# Patient Record
Sex: FEMALE | Race: WHITE | Employment: FULL TIME | URBAN - METROPOLITAN AREA
[De-identification: names, ages, dates, MRNs, and addresses within clinical notes are randomized per-mention and may not be internally consistent; named-entity substitution may affect disease eponyms.]

---

## 2017-01-25 ENCOUNTER — OFFICE VISIT (OUTPATIENT)
Dept: NEUROLOGY | Age: 47
End: 2017-01-25

## 2017-01-25 VITALS
SYSTOLIC BLOOD PRESSURE: 130 MMHG | RESPIRATION RATE: 18 BRPM | OXYGEN SATURATION: 98 % | DIASTOLIC BLOOD PRESSURE: 88 MMHG | HEART RATE: 98 BPM

## 2017-01-25 DIAGNOSIS — G43.109 MIGRAINE WITH VERTIGO: Primary | ICD-10-CM

## 2017-01-25 DIAGNOSIS — R42 VERTIGO: ICD-10-CM

## 2017-01-25 RX ORDER — AMITRIPTYLINE HYDROCHLORIDE 25 MG/1
25 TABLET, FILM COATED ORAL
Qty: 30 TAB | Refills: 3 | Status: SHIPPED | OUTPATIENT
Start: 2017-01-25 | End: 2017-04-21 | Stop reason: SDUPTHER

## 2017-01-25 NOTE — MR AVS SNAPSHOT
Visit Information Date & Time Provider Department Dept. Phone Encounter #  
 1/25/2017  1:40 PM Chantal Fritz Beasleyjames Jaramillo, [address] [facility] at [address] [id number] Follow-up Instructions Return in about 3 months (around 4/25/2017). Routing History Upcoming Health Maintenance Date Due DTaP/Tdap/Td series (1 - Tdap) 10/25/1991 INFLUENZA AGE 9 TO ADULT 8/1/2016 PAP AKA CERVICAL CYTOLOGY 5/14/2017 Allergies as of 1/25/2017  Review Complete On: 1/25/2017 By: Connye Lesches, LPN Severity Noted Reaction Type Reactions Aspirin High 03/09/2010   Intolerance Unknown (comments) Avoids due to brain aneurysm Current Immunizations  Never Reviewed No immunizations on file. Not reviewed this visit You Were Diagnosed With   
  
 Codes Comments Migraine with vertigo    -  Primary ICD-10-CM: G43.109 ICD-9-CM: 346.00 Vertigo     ICD-10-CM: L38 ICD-9-CM: 780.4 Vitals BP Pulse Resp SpO2 OB Status Smoking Status 130/88 98 18 98% Hysterectomy Former Smoker Vitals History Preferred Pharmacy Pharmacy Name Phone [phone] [facility] [phone] Your Updated Medication List  
  
   
This list is accurate as of: 1/25/17  1:58 PM.  Always use your most recent med list.  
  
  
  
  
 amitriptyline 25 mg tablet Commonly known as:  ELAVIL Take 1 Tab by mouth nightly. Start one half tablet at bedtime for 5 nights then increase to 1 tablet thereafter  
  
 butalbital-acetaminophen-caffeine -40 mg per tablet Commonly known as:  Mardeen Gaudencio Take 1 Tab by mouth every four (4) hours as needed for Pain. Do not exceed 6 tabs a day HYDROcodone-acetaminophen 5-325 mg per tablet Commonly known as:  Wayna Glaze Take  by mouth.  
  
 magnesium oxide 400 mg tablet Commonly known as:  MAG-OX Take 1 Tab by mouth nightly. methylPREDNISolone 4 mg tablet Commonly known as:  Efrain Tobin As directed  
  
 ondansetron 4 mg disintegrating tablet Commonly known as:  ZOFRAN ODT Take 1 Tab by mouth every eight (8) hours as needed for Nausea. topiramate 50 mg tablet Commonly known as:  TOPAMAX Take 1 Tab by mouth two (2) times a day. Indications: MIGRAINE PREVENTION  
  
 TYLENOL EXTRA STRENGTH 500 mg tablet Generic drug:  acetaminophen Take  by mouth every six (6) hours as needed for Pain. ZyrTEC 10 mg tablet Generic drug:  cetirizine Take 10 mg by mouth daily. Indications: SEASONAL ALLERGIC RHINITIS Prescriptions Sent to Pharmacy Refills  
 amitriptyline (ELAVIL) 25 mg tablet 3 Sig: Take 1 Tab by mouth nightly. Start one half tablet at bedtime for 5 nights then increase to 1 tablet thereafter Class: Normal  
 [facility] [address] [place] [address] #: [phone] Route: Oral  
  
Follow-up Instructions Return in about 3 months (around 4/25/2017). Patient Instructions PRESCRIPTION REFILL POLICY [facility] Statement to Patients April 1, 2014 In an effort to ensure the large volume of patient prescription refills is processed in the most efficient and expeditious manner, we are asking our patients to assist us by calling your Pharmacy for all prescription refills, this will include also your  Mail Order Pharmacy. The pharmacy will contact our office electronically to continue the refill process. Please do not wait until the last minute to call your pharmacy. We need at least 48 hours (2days) to fill prescriptions. We also encourage you to call your pharmacy before going to  your prescription to make sure it is ready.   
 
With regard to controlled substance prescription refill requests (narcotic refills) that need to be picked up at our office, we ask your cooperation by providing us with at least 72 hours (3days) notice that you will need a refill. We will not refill narcotic prescription refill requests after 4:00pm on any weekday, Monday through Thursday, or after 2:00pm on Fridays, or on the weekends. We encourage everyone to explore another way of getting your prescription refill request processed using [email or web address], our patient web portal through our electronic medical record system. GoalShare.com is an efficient and effective way to communicate your medication request directly to the office and  downloadable as an refugio on your smart phone . GoalShare.com also features a review functionality that allows you to view your medication list as well as leave messages for your physician. Are you ready to get connected? If so please review the attatched instructions or speak to any of our staff to get you set up right away! Thank you so much for your cooperation. Should you have any questions please contact our Practice Administrator. The Physicians and Staff,  [facility] Thank you for choosing [facility] and [facility] for your  
 
care. You may receive a survey about your visit. We appreciate you taking time  
 
to complete this survey as we use your feedback to improve our services. We  
 
realize we are not perfect, but we strive to provide excellent care. Introducing [facility] [place] [facility]! [facility] introduces [email or web address] patient portal. Now you can access parts of your medical record, email your doctor's office, and request medication refills online. 1. In your internet browser, go to [email or web address] 2. Click on the First Time User? Click Here link in the Sign In box. You will see the New Member Sign Up page. 3. Enter your GoalShare.com Access Code exactly as it appears below.  You will not need to use this code after youve completed the sign-up process. If you do not sign up before the expiration date, you must request a new code. · TripShake Access Code: [email or web address] Expires: 4/25/2017  1:01 PM 
 
4. Enter the last four digits of your Social Security Number (xxxx) and Date of Birth (mm/dd/yyyy) as indicated and click Submit. You will be taken to the next sign-up page. 5. Create a TripShake ID. This will be your TripShake login ID and cannot be changed, so think of one that is secure and easy to remember. 6. Create a TripShake password. You can change your password at any time. 7. Enter your Password Reset Question and Answer. This can be used at a later time if you forget your password. 8. Enter your e-mail address. You will receive e-mail notification when new information is available in [address]. 9. Click Sign Up. You can now view and download portions of your medical record. 10. Click the Download Summary menu link to download a portable copy of your medical information. If you have questions, please visit the Frequently Asked Questions section of the TripShake website. Remember, TripShake is NOT to be used for urgent needs. For medical emergencies, dial 911. Now available from your iPhone and Android! Please provide this summary of care documentation to your next provider. Your primary care clinician is listed as Alphonso Oviedo. If you have any questions after today's visit, please call [phone].

## 2017-01-25 NOTE — PROGRESS NOTES
Chief Complaint   Patient presents with    Headache     follow up       HPI    Ms. Hamilton Templeton is a 59-year-old woman here to follow-up. She has been seen here for chronic migraine for which she takes Topamax and magnesium with good results. She cannot remember the last time she had a severe migraine. She continues to have vertigo unfortunately which can occur at rest.  She is already been evaluated by ENT and no cause has been found. Review of Systems   Neurological: Positive for dizziness. All other systems reviewed and are negative. Past Medical History   Diagnosis Date    Anemia NEC     Aneurysm     Delivery normal      x3    Dermatitis     Headache(784.0)     HX OTHER MEDICAL     Stroke ([facility] [address]      brain anuerysm, 1998    Vertigo      Family History   Problem Relation Age of Onset    Diabetes Mother     Hypertension Mother     Heart Disease Maternal Aunt      Social History     Social History    Marital status:      Spouse name: N/A    Number of children: N/A    Years of education: N/A     Occupational History    Not on file. Social History Main Topics    Smoking status: Former Smoker     Packs/day: 1.00     Years: 5.00     Quit date: 3/9/2004    Smokeless tobacco: Never Used    Alcohol use Yes      Comment: rarely    Drug use: No    Sexual activity: Not Currently     Partners: Male     Birth control/ protection: None     Other Topics Concern    Not on file     Social History Narrative     Allergies   Allergen Reactions    Aspirin Unknown (comments)     Avoids due to brain aneurysm         Current Outpatient Prescriptions   Medication Sig    amitriptyline (ELAVIL) 25 mg tablet Take 1 Tab by mouth nightly. Start one half tablet at bedtime for 5 nights then increase to 1 tablet thereafter    topiramate (TOPAMAX) 50 mg tablet Take 1 Tab by mouth two (2) times a day.  Indications: MIGRAINE PREVENTION    magnesium oxide (MAG-OX) 400 mg tablet Take 1 Tab by mouth nightly.  butalbital-acetaminophen-caffeine (FIORICET, ESGIC) -40 mg per tablet Take 1 Tab by mouth every four (4) hours as needed for Pain. Do not exceed 6 tabs a day (Patient taking differently: Take  by mouth every four (4) hours as needed for Pain. Do not exceed 6 tabs a day)    cetirizine (ZYRTEC) 10 mg tablet Take 10 mg by mouth daily. Indications: SEASONAL ALLERGIC RHINITIS    acetaminophen (TYLENOL EXTRA STRENGTH) 500 mg tablet Take  by mouth every six (6) hours as needed for Pain.  HYDROcodone-acetaminophen (NORCO) 5-325 mg per tablet Take  by mouth.  methylPREDNISolone (MEDROL DOSEPACK) 4 mg tablet As directed    ondansetron (ZOFRAN ODT) 4 mg disintegrating tablet Take 1 Tab by mouth every eight (8) hours as needed for Nausea. No current facility-administered medications for this visit. Neurologic Exam     Mental Status        WD/WN adult in NAD, normal grooming  VSS  A&O x 3    PERRL, nonicteric  Face is symmetric, tongue midline  Speech is fluent and clear  No limb ataxia. No abnl movements. Moving all extemities spontaneously and symmetric  Normal gait    CVS RRR  Lungs nonlabored  Skin is warm and dry         Visit Vitals    /88    Pulse 98    Resp 18    SpO2 98%       Assessment and Plan   Pillo Byron was seen today for headache. Diagnoses and all orders for this visit:    Migraine with vertigo    Vertigo    Other orders  -     amitriptyline (ELAVIL) 25 mg tablet; Take 1 Tab by mouth nightly. Start one half tablet at bedtime for 5 nights then increase to 1 tablet thereafter      60-year-old woman with chronic migraine headaches responding to Topamax and magnesium regarding the severe painful migraines. I have suspicion the vertiginous events could be migraine equivalent. However, these symptoms occur at rest and are not exactly positional.  She also has a history of aneurysm requiring clipping and is a smoker.   I think it is prudent to obtain CTA of the head neck to determine the integrity of the posterior circulation and ensure there is no stenosis accounting for her symptoms. For now, I think it is reasonable to add amitriptyline low-dose for now as a trial to hopefully alleviate some of her symptoms. .    I would like her to follow-up in 3 months.       [address] [facility], [facility] [address]  Diplomate RUSS

## 2017-01-25 NOTE — PATIENT INSTRUCTIONS
10 [facility]   Statement to Patients  April 1, 2014      In an effort to ensure the large volume of patient prescription refills is processed in the most efficient and expeditious manner, we are asking our patients to assist us by calling your Pharmacy for all prescription refills, this will include also your  Mail Order Pharmacy. The pharmacy will contact our office electronically to continue the refill process. Please do not wait until the last minute to call your pharmacy. We need at least 48 hours (2days) to fill prescriptions. We also encourage you to call your pharmacy before going to  your prescription to make sure it is ready. With regard to controlled substance prescription refill requests (narcotic refills) that need to be picked up at our office, we ask your cooperation by providing us with at least 72 hours (3days) notice that you will need a refill. We will not refill narcotic prescription refill requests after 4:00pm on any weekday, Monday through Thursday, or after 2:00pm on Fridays, or on the weekends. We encourage everyone to explore another way of getting your prescription refill request processed using Sm[employer] Bucket, our patient web portal through our electronic medical record system. Smash Bucket is an efficient and effective way to communicate your medication request directly to the office and  downloadable as an refugio on your smart phone . Smash Bucket also features a review functionality that allows you to view your medication list as well as leave messages for your physician. Are you ready to get connected? If so please review the attatched instructions or speak to any of our staff to get you set up right away! Thank you so much for your cooperation. Should you have any questions please contact our Practice Administrator.     The Physicians and Staff,  [facility]     Thank you for choosing [facility] for your     care. You may receive a survey about your visit. We appreciate you taking time     to complete this survey as we use your feedback to improve our services. We     realize we are not perfect, but we strive to provide excellent care.

## 2017-02-01 ENCOUNTER — HOSPITAL ENCOUNTER (OUTPATIENT)
Dept: CT IMAGING | Age: 47
Discharge: HOME | End: 2017-02-01
Attending: PSYCHIATRY & NEUROLOGY
Payer: COMMERCIAL

## 2017-02-01 DIAGNOSIS — R42 VERTIGO: ICD-10-CM

## 2017-02-01 PROCEDURE — 74011250636 HC RX REV CODE- 250/636: Performed by: PSYCHIATRY & NEUROLOGY

## 2017-02-01 PROCEDURE — 70498 CT ANGIOGRAPHY NECK: CPT

## 2017-02-01 PROCEDURE — 74011636320 HC RX REV CODE- 636/320: Performed by: PSYCHIATRY & NEUROLOGY

## 2017-02-01 RX ORDER — SODIUM CHLORIDE 9 MG/ML
50 INJECTION, SOLUTION INTRAVENOUS
Status: COMPLETED | OUTPATIENT
Start: 2017-02-01 | End: 2017-02-01

## 2017-02-01 RX ORDER — SODIUM CHLORIDE 0.9 % (FLUSH) 0.9 %
10 SYRINGE (ML) INJECTION
Status: COMPLETED | OUTPATIENT
Start: 2017-02-01 | End: 2017-02-01

## 2017-02-01 RX ADMIN — SODIUM CHLORIDE 50 ML/HR: 900 INJECTION, SOLUTION INTRAVENOUS at 13:48

## 2017-02-01 RX ADMIN — IOPAMIDOL 100 ML: 755 INJECTION, SOLUTION INTRAVENOUS at 13:48

## 2017-02-01 RX ADMIN — Medication 10 ML: at 13:48

## 2017-02-21 ENCOUNTER — TELEPHONE (OUTPATIENT)
Dept: NEUROLOGY | Age: 47
End: 2017-02-21

## 2017-02-21 NOTE — TELEPHONE ENCOUNTER
Pt calling in re to test results from the test she had done on February 1st. Please give her a call back

## 2017-04-21 ENCOUNTER — OFFICE VISIT (OUTPATIENT)
Dept: NEUROLOGY | Age: 47
End: 2017-04-21

## 2017-04-21 VITALS
OXYGEN SATURATION: 98 % | SYSTOLIC BLOOD PRESSURE: 122 MMHG | HEART RATE: 105 BPM | RESPIRATION RATE: 18 BRPM | DIASTOLIC BLOOD PRESSURE: 90 MMHG

## 2017-04-21 DIAGNOSIS — G43.109 MIGRAINE WITH VERTIGO: Primary | ICD-10-CM

## 2017-04-21 RX ORDER — AMITRIPTYLINE HYDROCHLORIDE 25 MG/1
25 TABLET, FILM COATED ORAL
Qty: 30 TAB | Refills: 3 | Status: SHIPPED | OUTPATIENT
Start: 2017-04-21 | End: 2017-07-19 | Stop reason: SDUPTHER

## 2017-04-21 RX ORDER — TOPIRAMATE 50 MG/1
50 TABLET, FILM COATED ORAL 2 TIMES DAILY
Qty: 120 TAB | Refills: 2 | Status: SHIPPED | OUTPATIENT
Start: 2017-04-21 | End: 2017-07-19 | Stop reason: SDUPTHER

## 2017-04-21 RX ORDER — LORATADINE 10 MG/1
10 TABLET ORAL
COMMUNITY
End: 2021-12-07 | Stop reason: ALTCHOICE

## 2017-04-21 NOTE — LETTER
4/21/2017 Patient:  Jillian Yun YOB: 1970 Date of Visit: 4/21/2017 Dear Leydi Wade MD 
[address], [address] . [address] [facility] [address] VIA In Basket : 
 
 
I was requested by Leydi Wade MD to evaluate Ms. Ewelina Celaya  for Chief Complaint Patient presents with  
 Headache Wolm Faustino I am recommending the following:  
 
Yesenia Garza was seen today for headache. Diagnoses and all orders for this visit: 
 
Migraine with vertigo Other orders -     topiramate (TOPAMAX) 50 mg tablet; Take 1 Tab by mouth two (2) times a day. Indications: MIGRAINE PREVENTION 
-     amitriptyline (ELAVIL) 25 mg tablet; Take 1 Tab by mouth nightly. 
 
 
 
---------------------------------------------------------------------------------------------------------------------- Below is my encounter: Chief Complaint Patient presents with  
 Headache HPI Ms. Anna Cisneros is a 55-year-old woman here to follow-up on migraine headaches and vertiginous symptoms. History of intracranial aneurysm clipping. Since her last visit we added amitriptyline to Topamax and magnesium. Overall good results. She only had one severe vertiginous event. Headaches are more controlled. CTA is also very benign. Review of Systems Neurological: Positive for dizziness and headaches. All other systems reviewed and are negative. Past Medical History:  
Diagnosis Date  Anemia NEC  Aneurysm  Delivery normal   
 x3  Dermatitis  Headache [address]  Stroke ([facility] [address] brain anuerysm, 1998  Vertigo Family History Problem Relation Age of Onset  Diabetes Mother  Hypertension Mother  Heart Disease Maternal Aunt Social History Social History  Marital status:  Spouse name: N/A  
 Number of children: N/A  
 Years of education: N/A Occupational History  Not on file. Social History Main Topics  Smoking status: Former Smoker Packs/day: 1.00 Years: 5.00 Quit date: 3/9/2004  Smokeless tobacco: Never Used  Alcohol use Yes Comment: rarely  Drug use: No  
 Sexual activity: Not Currently Partners: Male Birth control/ protection: None Other Topics Concern  Not on file Social History Narrative Allergies Allergen Reactions  Aspirin Unknown (comments) Avoids due to brain aneurysm Current Outpatient Prescriptions Medication Sig  loratadine (CLARITIN) 10 mg tablet Take 10 mg by mouth.  topiramate (TOPAMAX) 50 mg tablet Take 1 Tab by mouth two (2) times a day. Indications: MIGRAINE PREVENTION  
 amitriptyline (ELAVIL) 25 mg tablet Take 1 Tab by mouth nightly.  magnesium oxide (MAG-OX) 400 mg tablet Take 1 Tab by mouth nightly.  butalbital-acetaminophen-caffeine (FIORICET, ESGIC) -40 mg per tablet Take 1 Tab by mouth every four (4) hours as needed for Pain. Do not exceed 6 tabs a day (Patient taking differently: Take  by mouth every four (4) hours as needed for Pain. Do not exceed 6 tabs a day)  HYDROcodone-acetaminophen (NORCO) 5-325 mg per tablet Take  by mouth.  methylPREDNISolone (MEDROL DOSEPACK) 4 mg tablet As directed  cetirizine (ZYRTEC) 10 mg tablet Take 10 mg by mouth daily. Indications: SEASONAL ALLERGIC RHINITIS  ondansetron (ZOFRAN ODT) 4 mg disintegrating tablet Take 1 Tab by mouth every eight (8) hours as needed for Nausea.  acetaminophen (TYLENOL EXTRA STRENGTH) 500 mg tablet Take  by mouth every six (6) hours as needed for Pain. No current facility-administered medications for this visit. Neurologic Exam  
 
Mental Status WD/WN adult in NAD, normal grooming VSS 
A&O x 3 PERRL, nonicteric Face is symmetric, tongue midline Speech is fluent and clear No limb ataxia. No abnl movements. Moving all extemities spontaneously and symmetric Normal gait CVS RRR Lungs nonlabored Skin is warm and dry Visit Vitals  /90  Pulse (!) 105  Resp 18  SpO2 98% Assessment and Plan Migraine with vertigo Other orders -     topiramate (TOPAMAX) 50 mg tablet; Take 1 Tab by mouth two (2) times a day. Indications: MIGRAINE PREVENTION 
-     amitriptyline (ELAVIL) 25 mg tablet; Take 1 Tab by mouth nightly. 44-year-old woman who has migraine headaches and vertiginous migraine as well. Good control using topiramate and amitriptyline. No changes to that. Okay to stop magnesium since she has been out of it and there is been no change. I discussed the CTA results with her as well. I would like her to follow-up in 3 months. Thank you for giving me the opportunity to assist in the care of Ms. Juan Krueger. If you have questions, please do not hesitate to contact me. Sincerely, [address] [facility], DO Neurologist 
Diplomate RUSS

## 2017-04-21 NOTE — PATIENT INSTRUCTIONS
10 [facility]   Statement to Patients  April 1, 2014      In an effort to ensure the large volume of patient prescription refills is processed in the most efficient and expeditious manner, we are asking our patients to assist us by calling your Pharmacy for all prescription refills, this will include also your  Mail Order Pharmacy. The pharmacy will contact our office electronically to continue the refill process. Please do not wait until the last minute to call your pharmacy. We need at least 48 hours (2days) to fill prescriptions. We also encourage you to call your pharmacy before going to  your prescription to make sure it is ready. With regard to controlled substance prescription refill requests (narcotic refills) that need to be picked up at our office, we ask your cooperation by providing us with at least 72 hours (3days) notice that you will need a refill. We will not refill narcotic prescription refill requests after 4:00pm on any weekday, Monday through Thursday, or after 2:00pm on Fridays, or on the weekends. We encourage everyone to explore another way of getting your prescription refill request processed using Sim[employer]iField, our patient web portal through our electronic medical record system. SimpliField is an efficient and effective way to communicate your medication request directly to the office and  downloadable as an refugio on your smart phone . Sim[employer]iField also features a review functionality that allows you to view your medication list as well as leave messages for your physician. Are you ready to get connected? If so please review the attatched instructions or speak to any of our staff to get you set up right away! Thank you so much for your cooperation. Should you have any questions please contact our Practice Administrator.     The Physicians and Staff,  [facility]     Thank you for choosing [facility] and [facility] for your     care. You may receive a survey about your visit. We appreciate you taking time     to complete this survey as we use your feedback to improve our services. We     realize we are not perfect, but we strive to provide excellent care.

## 2017-04-21 NOTE — MR AVS SNAPSHOT
Visit Information Date & Time Provider Department Dept. Phone Encounter #  
 4/21/2017 10:40  [facility] [address] [id number] Follow-up Instructions Return in about 3 months (around 7/21/2017). Routing History Upcoming Health Maintenance Date Due DTaP/Tdap/Td series (1 - Tdap) 10/25/1991 INFLUENZA AGE 9 TO ADULT 8/1/2016 PAP AKA CERVICAL CYTOLOGY 5/14/2017 Allergies as of 4/21/2017  Review Complete On: 4/21/2017 By: David Castro LPN Severity Noted Reaction Type Reactions Aspirin High 03/09/2010   Intolerance Unknown (comments) Avoids due to brain aneurysm Current Immunizations  Never Reviewed No immunizations on file. Not reviewed this visit You Were Diagnosed With   
  
 Codes Comments Migraine with vertigo    -  Primary ICD-10-CM: G43.109 ICD-9-CM: 346.00 Vitals BP Pulse Resp SpO2 OB Status Smoking Status 122/90 (!) 105 18 98% Hysterectomy Former Smoker Preferred Pharmacy Pharmacy Name Phone [id number] [phone] [facility] [phone] Your Updated Medication List  
  
   
This list is accurate as of: 4/21/17 11:24 AM.  Always use your most recent med list.  
  
  
  
  
 amitriptyline 25 mg tablet Commonly known as:  ELAVIL Take 1 Tab by mouth nightly. butalbital-acetaminophen-caffeine -40 mg per tablet Commonly known as:  Reginold Smolder Take 1 Tab by mouth every four (4) hours as needed for Pain. Do not exceed 6 tabs a day CLARITIN 10 mg tablet Generic drug:  loratadine Take 10 mg by mouth. HYDROcodone-acetaminophen 5-325 mg per tablet Commonly known as:  Blue Julio Take  by mouth.  
  
 magnesium oxide 400 mg tablet Commonly known as:  MAG-OX Take 1 Tab by mouth nightly. methylPREDNISolone 4 mg tablet Commonly known as:  Cari Fabian As directed  
  
 ondansetron 4 mg disintegrating tablet Commonly known as:  ZOFRAN ODT Take 1 Tab by mouth every eight (8) hours as needed for Nausea. topiramate 50 mg tablet Commonly known as:  TOPAMAX Take 1 Tab by mouth two (2) times a day. Indications: MIGRAINE PREVENTION  
  
 TYLENOL EXTRA STRENGTH 500 mg tablet Generic drug:  acetaminophen Take  by mouth every six (6) hours as needed for Pain. ZyrTEC 10 mg tablet Generic drug:  cetirizine Take 10 mg by mouth daily. Indications: SEASONAL ALLERGIC RHINITIS Prescriptions Sent to Pharmacy Refills  
 topiramate (TOPAMAX) 50 mg tablet 2 Sig: Take 1 Tab by mouth two (2) times a day. Indications: MIGRAINE PREVENTION Class: Normal  
 Pharmacy: [address] #: [phone] Route: Oral  
 amitriptyline (ELAVIL) 25 mg tablet 3 Sig: Take 1 Tab by mouth nightly. Class: Normal  
 Pharmacy: [address] #: [phone] Route: Oral  
  
Follow-up Instructions Return in about 3 months (around 7/21/2017). Patient Instructions PRESCRIPTION REFILL POLICY [facility] Statement to Patients April 1, 2014 In an effort to ensure the large volume of patient prescription refills is processed in the most efficient and expeditious manner, we are asking our patients to assist us by calling your Pharmacy for all prescription refills, this will include also your  Mail Order Pharmacy. The pharmacy will contact our office electronically to continue the refill process. Please do not wait until the last minute to call your pharmacy. We need at least 48 hours (2days) to fill prescriptions.  We also encourage you to call your pharmacy before going to  your prescription to make sure it is ready. With regard to controlled substance prescription refill requests (narcotic refills) that need to be picked up at our office, we ask your cooperation by providing us with at least 72 hours (3days) notice that you will need a refill. We will not refill narcotic prescription refill requests after 4:00pm on any weekday, Monday through Thursday, or after 2:00pm on Fridays, or on the weekends. We encourage everyone to explore another way of getting your prescription refill request processed using [employer], our patient web portal through our electronic medical record system. [employer] is an efficient and effective way to communicate your medication request directly to the office and  downloadable as an refugio on your smart phone . [employer] also features a review functionality that allows you to view your medication list as well as leave messages for your physician. Are you ready to get connected? If so please review the attatched instructions or speak to any of our staff to get you set up right away! Thank you so much for your cooperation. Should you have any questions please contact our Practice Administrator. The Physicians and Staff,  [facility] Thank you for choosing [facility] and [facility] for your  
 
care. You may receive a survey about your visit. We appreciate you taking time  
 
to complete this survey as we use your feedback to improve our services. We  
 
realize we are not perfect, but we strive to provide excellent care. Introducing [facility] SERVICES! [facility] introduces [employer] patient portal. Now you can access parts of your medical record, email your doctor's office, and request medication refills online. 1. In your internet browser, go to [email or web address] 2. Click on the First Time User? Click Here link in the Sign In box. You will see the New Member Sign Up page. 3. Enter your [employer] Access Code exactly as it appears below. You will not need to use this code after youve completed the sign-up process. If you do not sign up before the expiration date, you must request a new code. · [employer] Access Code: [email or web address] Expires: 4/25/2017  2:01 PM 
 
4. Enter the last four digits of your Social Security Number (xxxx) and Date of Birth (mm/dd/yyyy) as indicated and click Submit. You will be taken to the next sign-up page. 5. Create a [employer] ID. This will be your [employer] login ID and cannot be changed, so think of one that is secure and easy to remember. 6. Create a [employer] password. You can change your password at any time. 7. Enter your Password Reset Question and Answer. This can be used at a later time if you forget your password. 8. Enter your e-mail address. You will receive e-mail notification when new information is available in [address]. 9. Click Sign Up. You can now view and download portions of your medical record. 10. Click the Download Summary menu link to download a portable copy of your medical information. If you have questions, please visit the Frequently Asked Questions section of the [employer] website. Remember, [employer] is NOT to be used for urgent needs. For medical emergencies, dial 911. Now available from your iPhone and Android! Please provide this summary of care documentation to your next provider. Your primary care clinician is listed as [place] Officer. If you have any questions after today's visit, please call [phone].

## 2017-04-21 NOTE — COMMUNICATION BODY
Chief Complaint   Patient presents with    Headache       HPI    Ms. Swetha Leiva is a 24-year-old woman here to follow-up on migraine headaches and vertiginous symptoms. History of intracranial aneurysm clipping. Since her last visit we added amitriptyline to Topamax and magnesium. Overall good results. She only had one severe vertiginous event. Headaches are more controlled. CTA is also very benign. Review of Systems   Neurological: Positive for dizziness and headaches. All other systems reviewed and are negative. Past Medical History:   Diagnosis Date    Anemia NEC     Aneurysm     Delivery normal     x3    Dermatitis     Headache     HX OTHER MEDICAL     Stroke [address] [address]     brain anuerysm, 1998    Vertigo      Family History   Problem Relation Age of Onset    Diabetes Mother     Hypertension Mother     Heart Disease Maternal Aunt      Social History     Social History    Marital status:      Spouse name: N/A    Number of children: N/A    Years of education: N/A     Occupational History    Not on file. Social History Main Topics    Smoking status: Former Smoker     Packs/day: 1.00     Years: 5.00     Quit date: 3/9/2004    Smokeless tobacco: Never Used    Alcohol use Yes      Comment: rarely    Drug use: No    Sexual activity: Not Currently     Partners: Male     Birth control/ protection: None     Other Topics Concern    Not on file     Social History Narrative     Allergies   Allergen Reactions    Aspirin Unknown (comments)     Avoids due to brain aneurysm         Current Outpatient Prescriptions   Medication Sig    loratadine (CLARITIN) 10 mg tablet Take 10 mg by mouth.  topiramate (TOPAMAX) 50 mg tablet Take 1 Tab by mouth two (2) times a day. Indications: MIGRAINE PREVENTION    amitriptyline (ELAVIL) 25 mg tablet Take 1 Tab by mouth nightly.  magnesium oxide (MAG-OX) 400 mg tablet Take 1 Tab by mouth nightly.     butalbital-acetaminophen-caffeine (FIORICET, ESGIC) -40 mg per tablet Take 1 Tab by mouth every four (4) hours as needed for Pain. Do not exceed 6 tabs a day (Patient taking differently: Take  by mouth every four (4) hours as needed for Pain. Do not exceed 6 tabs a day)    HYDROcodone-acetaminophen (NORCO) 5-325 mg per tablet Take  by mouth.  methylPREDNISolone (MEDROL DOSEPACK) 4 mg tablet As directed    cetirizine (ZYRTEC) 10 mg tablet Take 10 mg by mouth daily. Indications: SEASONAL ALLERGIC RHINITIS    ondansetron (ZOFRAN ODT) 4 mg disintegrating tablet Take 1 Tab by mouth every eight (8) hours as needed for Nausea.  acetaminophen (TYLENOL EXTRA STRENGTH) 500 mg tablet Take  by mouth every six (6) hours as needed for Pain. No current facility-administered medications for this visit. Neurologic Exam     Mental Status        WD/WN adult in NAD, normal grooming  VSS  A&O x 3    PERRL, nonicteric  Face is symmetric, tongue midline  Speech is fluent and clear  No limb ataxia. No abnl movements. Moving all extemities spontaneously and symmetric  Normal gait    CVS RRR  Lungs nonlabored  Skin is warm and dry         Visit Vitals    /90    Pulse (!) 105    Resp 18    SpO2 98%       Assessment and Plan   Rosalinda Overton was seen today for headache. Diagnoses and all orders for this visit:    Migraine with vertigo    Other orders  -     topiramate (TOPAMAX) 50 mg tablet; Take 1 Tab by mouth two (2) times a day. Indications: MIGRAINE PREVENTION  -     amitriptyline (ELAVIL) 25 mg tablet; Take 1 Tab by mouth nightly. 42-year-old woman who has migraine headaches and vertiginous migraine as well. Good control using topiramate and amitriptyline. No changes to that. Okay to stop magnesium since she has been out of it and there is been no change. I discussed the CTA results with her as well. I would like her to follow-up in 3 months.           [address] [facility], [address]  Diplomate SINGHN

## 2017-04-21 NOTE — PROGRESS NOTES
Chief Complaint   Patient presents with    Headache       HPI    Ms. Markie Hughes is a 26-year-old woman here to follow-up on migraine headaches and vertiginous symptoms. History of intracranial aneurysm clipping. Since her last visit we added amitriptyline to Topamax and magnesium. Overall good results. She only had one severe vertiginous event. Headaches are more controlled. CTA is also very benign. Review of Systems   Neurological: Positive for dizziness and headaches. All other systems reviewed and are negative. Past Medical History:   Diagnosis Date    Anemia NEC     Aneurysm     Delivery normal     x3    Dermatitis     Headache     HX OTHER MEDICAL     Stroke ([facility] [address]     brain anuerysm, 1998    Vertigo      Family History   Problem Relation Age of Onset    Diabetes Mother     Hypertension Mother     Heart Disease Maternal Aunt      Social History     Social History    Marital status:      Spouse name: N/A    Number of children: N/A    Years of education: N/A     Occupational History    Not on file. Social History Main Topics    Smoking status: Former Smoker     Packs/day: 1.00     Years: 5.00     Quit date: 3/9/2004    Smokeless tobacco: Never Used    Alcohol use Yes      Comment: rarely    Drug use: No    Sexual activity: Not Currently     Partners: Male     Birth control/ protection: None     Other Topics Concern    Not on file     Social History Narrative     Allergies   Allergen Reactions    Aspirin Unknown (comments)     Avoids due to brain aneurysm         Current Outpatient Prescriptions   Medication Sig    loratadine (CLARITIN) 10 mg tablet Take 10 mg by mouth.  topiramate (TOPAMAX) 50 mg tablet Take 1 Tab by mouth two (2) times a day. Indications: MIGRAINE PREVENTION    amitriptyline (ELAVIL) 25 mg tablet Take 1 Tab by mouth nightly.  magnesium oxide (MAG-OX) 400 mg tablet Take 1 Tab by mouth nightly.     butalbital-acetaminophen-caffeine (FIORICET, ESGIC) -40 mg per tablet Take 1 Tab by mouth every four (4) hours as needed for Pain. Do not exceed 6 tabs a day (Patient taking differently: Take  by mouth every four (4) hours as needed for Pain. Do not exceed 6 tabs a day)    HYDROcodone-acetaminophen (NORCO) 5-325 mg per tablet Take  by mouth.  methylPREDNISolone (MEDROL DOSEPACK) 4 mg tablet As directed    cetirizine (ZYRTEC) 10 mg tablet Take 10 mg by mouth daily. Indications: SEASONAL ALLERGIC RHINITIS    ondansetron (ZOFRAN ODT) 4 mg disintegrating tablet Take 1 Tab by mouth every eight (8) hours as needed for Nausea.  acetaminophen (TYLENOL EXTRA STRENGTH) 500 mg tablet Take  by mouth every six (6) hours as needed for Pain. No current facility-administered medications for this visit. Neurologic Exam     Mental Status        WD/WN adult in NAD, normal grooming  VSS  A&O x 3    PERRL, nonicteric  Face is symmetric, tongue midline  Speech is fluent and clear  No limb ataxia. No abnl movements. Moving all extemities spontaneously and symmetric  Normal gait    CVS RRR  Lungs nonlabored  Skin is warm and dry         Visit Vitals    /90    Pulse (!) 105    Resp 18    SpO2 98%       Assessment and Plan   Cruz Areas was seen today for headache. Diagnoses and all orders for this visit:    Migraine with vertigo    Other orders  -     topiramate (TOPAMAX) 50 mg tablet; Take 1 Tab by mouth two (2) times a day. Indications: MIGRAINE PREVENTION  -     amitriptyline (ELAVIL) 25 mg tablet; Take 1 Tab by mouth nightly. 61-year-old woman who has migraine headaches and vertiginous migraine as well. Good control using topiramate and amitriptyline. No changes to that. Okay to stop magnesium since she has been out of it and there is been no change. I discussed the CTA results with her as well. I would like her to follow-up in 3 months.           [address] [facility], [address]  Diplomate ABPN

## 2017-07-19 ENCOUNTER — OFFICE VISIT (OUTPATIENT)
Dept: NEUROLOGY | Age: 47
End: 2017-07-19

## 2017-07-19 VITALS
HEART RATE: 100 BPM | OXYGEN SATURATION: 98 % | RESPIRATION RATE: 18 BRPM | DIASTOLIC BLOOD PRESSURE: 80 MMHG | SYSTOLIC BLOOD PRESSURE: 120 MMHG

## 2017-07-19 DIAGNOSIS — G43.109 MIGRAINE WITH VERTIGO: Primary | ICD-10-CM

## 2017-07-19 RX ORDER — TOPIRAMATE 50 MG/1
50 TABLET, FILM COATED ORAL 2 TIMES DAILY
Qty: 180 TAB | Refills: 1 | Status: SHIPPED | OUTPATIENT
Start: 2017-07-19 | End: 2018-01-11 | Stop reason: SDUPTHER

## 2017-07-19 RX ORDER — AMITRIPTYLINE HYDROCHLORIDE 25 MG/1
25 TABLET, FILM COATED ORAL
Qty: 90 TAB | Refills: 1 | Status: SHIPPED | OUTPATIENT
Start: 2017-07-19 | End: 2018-01-11 | Stop reason: SDUPTHER

## 2017-07-19 NOTE — PATIENT INSTRUCTIONS
[address] [facility]   Statement to Patients  April 1, 2014      In an effort to ensure the large volume of patient prescription refills is processed in the most efficient and expeditious manner, we are asking our patients to assist us by calling your Pharmacy for all prescription refills, this will include also your  Mail Order Pharmacy. The pharmacy will contact our office electronically to continue the refill process. Please do not wait until the last minute to call your pharmacy. We need at least 48 hours (2days) to fill prescriptions. We also encourage you to call your pharmacy before going to  your prescription to make sure it is ready. With regard to controlled substance prescription refill requests (narcotic refills) that need to be picked up at our office, we ask your cooperation by providing us with at least 72 hours (3days) notice that you will need a refill. We will not refill narcotic prescription refill requests after 4:00pm on any weekday, Monday through Thursday, or after 2:00pm on Fridays, or on the weekends. We encourage everyone to explore another way of getting your prescription refill request processed using [employer], our patient web portal through our electronic medical record system. [employer] is an efficient and effective way to communicate your medication request directly to the office and  downloadable as an refugio on your smart phone . [employer] also features a review functionality that allows you to view your medication list as well as leave messages for your physician. Are you ready to get connected? If so please review the attatched instructions or speak to any of our staff to get you set up right away! Thank you so much for your cooperation. Should you have any questions please contact our Practice Administrator.     The Physicians and Staff,  [facility]     Thank you for choosing [facility] for your     care. You may receive a survey about your visit. We appreciate you taking time     to complete this survey as we use your feedback to improve our services. We     realize we are not perfect, but we strive to provide excellent care.

## 2017-07-19 NOTE — PROGRESS NOTES
Chief Complaint   Patient presents with    Headache       HPI    Ms. Paty Esteves is a 49-year-old woman here to follow-up on migrainous vertigo and headaches. Last visit we continued amitriptyline and topiramate but discontinued magnesium. She has had good control in general.  Very rare symptoms. Running low on medication so she is reduced her topiramate by half so as to not run out and as a result the headaches have returned like typical.  Mild paresthesias in the fingers occasionally due to topiramate. Review of Systems   Neurological: Positive for tingling and headaches. All other systems reviewed and are negative. Past Medical History:   Diagnosis Date    Anemia NEC     Aneurysm     Delivery normal     x3    Dermatitis     Headache     HX OTHER MEDICAL     Stroke ([facility] [address]     brain anuerysm, 1998    Vertigo      Family History   Problem Relation Age of Onset    Diabetes Mother     Hypertension Mother     Heart Disease Maternal Aunt      Social History     Social History    Marital status:      Spouse name: N/A    Number of children: N/A    Years of education: N/A     Occupational History    Not on file. Social History Main Topics    Smoking status: Former Smoker     Packs/day: 1.00     Years: 5.00     Quit date: 3/9/2004    Smokeless tobacco: Never Used    Alcohol use Yes      Comment: rarely    Drug use: No    Sexual activity: Not Currently     Partners: Male     Birth control/ protection: None     Other Topics Concern    Not on file     Social History Narrative     Allergies   Allergen Reactions    Aspirin Unknown (comments)     Avoids due to brain aneurysm         Current Outpatient Prescriptions   Medication Sig    topiramate (TOPAMAX) 50 mg tablet Take 1 Tab by mouth two (2) times a day. Indications: MIGRAINE PREVENTION    amitriptyline (ELAVIL) 25 mg tablet Take 1 Tab by mouth nightly.  loratadine (CLARITIN) 10 mg tablet Take 10 mg by mouth.     magnesium oxide (MAG-OX) 400 mg tablet Take 1 Tab by mouth nightly.  HYDROcodone-acetaminophen (NORCO) 5-325 mg per tablet Take  by mouth.  methylPREDNISolone (MEDROL DOSEPACK) 4 mg tablet As directed    butalbital-acetaminophen-caffeine (FIORICET, ESGIC) -40 mg per tablet Take 1 Tab by mouth every four (4) hours as needed for Pain. Do not exceed 6 tabs a day (Patient taking differently: Take  by mouth every four (4) hours as needed for Pain. Do not exceed 6 tabs a day)    cetirizine (ZYRTEC) 10 mg tablet Take 10 mg by mouth daily. Indications: SEASONAL ALLERGIC RHINITIS    ondansetron (ZOFRAN ODT) 4 mg disintegrating tablet Take 1 Tab by mouth every eight (8) hours as needed for Nausea.  acetaminophen (TYLENOL EXTRA STRENGTH) 500 mg tablet Take  by mouth every six (6) hours as needed for Pain. No current facility-administered medications for this visit. Neurologic Exam     Mental Status        WD/WN adult in NAD, normal grooming  VSS  A&O x 3    PERRL, nonicteric  Face is symmetric, tongue midline  Speech is fluent and clear  No limb ataxia. No abnl movements. Moving all extemities spontaneously and symmetric  Normal gait    CVS RRR  Lungs nonlabored  Skin is warm and dry         Visit Vitals    /80    Pulse 100    Resp 18    SpO2 98%       Assessment and Plan   Suyapa Puga was seen today for headache. Diagnoses and all orders for this visit:    Migraine with vertigo    Other orders  -     topiramate (TOPAMAX) 50 mg tablet; Take 1 Tab by mouth two (2) times a day. Indications: MIGRAINE PREVENTION  -     amitriptyline (ELAVIL) 25 mg tablet; Take 1 Tab by mouth nightly. 22-year-old woman who has migrainous vertigo. Good control using topiramate and amitriptyline so we will refill these and resume them. No unusual side effects. No changes. I would like to see her in 6 months. I reviewed and decided to continue the current medications.       [address] [place] [address], DO  NEUROLOGIST  Diplomate ABPN

## 2017-07-19 NOTE — MR AVS SNAPSHOT
Visit Information Date & Time Provider Department Dept. Phone Encounter #  
 7/19/2017  2:15 PM [facility] at [address] [id number] Follow-up Instructions Return in about 6 months (around 1/19/2018). Routing History Upcoming Health Maintenance Date Due DTaP/Tdap/Td series (1 - Tdap) 10/25/1991 PAP AKA CERVICAL CYTOLOGY 5/14/2017 INFLUENZA AGE 9 TO ADULT 8/1/2017 Allergies as of 7/19/2017  Review Complete On: 7/19/2017 By: Javier Hernandez LPN Severity Noted Reaction Type Reactions Aspirin High 03/09/2010   Intolerance Unknown (comments) Avoids due to brain aneurysm Current Immunizations  Never Reviewed No immunizations on file. Not reviewed this visit You Were Diagnosed With   
  
 Codes Comments Migraine with vertigo    -  Primary ICD-10-CM: G43.109 ICD-9-CM: 346.00 Vitals BP Pulse Resp SpO2 OB Status Smoking Status 120/80 100 18 98% Hysterectomy Former Smoker Preferred Pharmacy Pharmacy Name Phone [phone] [phone] Your Updated Medication List  
  
   
This list is accurate as of: 7/19/17  2:39 PM.  Always use your most recent med list.  
  
  
  
  
 amitriptyline 25 mg tablet Commonly known as:  ELAVIL Take 1 Tab by mouth nightly. butalbital-acetaminophen-caffeine -40 mg per tablet Commonly known as:  Mack Antu Take 1 Tab by mouth every four (4) hours as needed for Pain. Do not exceed 6 tabs a day CLARITIN 10 mg tablet Generic drug:  loratadine Take 10 mg by mouth. HYDROcodone-acetaminophen 5-325 mg per tablet Commonly known as:  Barbra Wymore Take  by mouth.  
  
 magnesium oxide 400 mg tablet Commonly known as:  MAG-OX Take 1 Tab by mouth nightly. methylPREDNISolone 4 mg tablet Commonly known as:  Courtney Caballero As directed  
  
 ondansetron 4 mg disintegrating tablet Commonly known as:  ZOFRAN ODT Take 1 Tab by mouth every eight (8) hours as needed for Nausea. topiramate 50 mg tablet Commonly known as:  TOPAMAX Take 1 Tab by mouth two (2) times a day. Indications: MIGRAINE PREVENTION  
  
 TYLENOL EXTRA STRENGTH 500 mg tablet Generic drug:  acetaminophen Take  by mouth every six (6) hours as needed for Pain. ZyrTEC 10 mg tablet Generic drug:  cetirizine Take 10 mg by mouth daily. Indications: SEASONAL ALLERGIC RHINITIS Prescriptions Sent to Pharmacy Refills  
 topiramate (TOPAMAX) 50 mg tablet 1 Sig: Take 1 Tab by mouth two (2) times a day. Indications: MIGRAINE PREVENTION Class: Normal  
 Pharmacy: [address] #: [phone] Route: Oral  
 amitriptyline (ELAVIL) 25 mg tablet 1 Sig: Take 1 Tab by mouth nightly. Class: Normal  
 Pharmacy: [address] #: [phone] Route: Oral  
  
Follow-up Instructions Return in about 6 months (around 1/19/2018). Patient Instructions PRESCRIPTION REFILL POLICY [facility] Statement to Patients April 1, 2014 In an effort to ensure the large volume of patient prescription refills is processed in the most efficient and expeditious manner, we are asking our patients to assist us by calling your Pharmacy for all prescription refills, this will include also your  Mail Order Pharmacy. The pharmacy will contact our office electronically to continue the refill process. Please do not wait until the last minute to call your pharmacy. We need at least 48 hours (2days) to fill prescriptions.  We also encourage you to call your pharmacy before going to  your prescription to make sure it is ready. With regard to controlled substance prescription refill requests (narcotic refills) that need to be picked up at our office, we ask your cooperation by providing us with at least 72 hours (3days) notice that you will need a refill. We will not refill narcotic prescription refill requests after 4:00pm on any weekday, Monday through Thursday, or after 2:00pm on Fridays, or on the weekends. We encourage everyone to explore another way of getting your prescription refill request processed using GeoGames, our patient web portal through our electronic medical record system. GeoGames is an efficient and effective way to communicate your medication request directly to the office and  downloadable as an refugio on your smart phone . GeoGames also features a review functionality that allows you to view your medication list as well as leave messages for your physician. Are you ready to get connected? If so please review the attatched instructions or speak to any of our staff to get you set up right away! Thank you so much for your cooperation. Should you have any questions please contact our Practice Administrator. The Physicians and Staff,  [facility] Thank you for choosing [facility] and [facility] for your  
 
care. You may receive a survey about your visit. We appreciate you taking time  
 
to complete this survey as we use your feedback to improve our services. We  
 
realize we are not perfect, but we strive to provide excellent care. Introducing [facility] & HEALTH SERVICES! [facility] introduces GeoGames patient portal. Now you can access parts of your medical record, email your doctor's office, and request medication refills online. 1. In your internet browser, go to [email or web address] 2. Click on the First Time User? Click Here link in the Sign In box. You will see the New Member Sign Up page. 3. Enter your Findery Access Code exactly as it appears below. You will not need to use this code after youve completed the sign-up process. If you do not sign up before the expiration date, you must request a new code. · Findery Access Code: [email or web address] Expires: 10/17/2017  2:08 PM 
 
4. Enter the last four digits of your Social Security Number (xxxx) and Date of Birth (mm/dd/yyyy) as indicated and click Submit. You will be taken to the next sign-up page. 5. Create a Findery ID. This will be your Findery login ID and cannot be changed, so think of one that is secure and easy to remember. 6. Create a Findery password. You can change your password at any time. 7. Enter your Password Reset Question and Answer. This can be used at a later time if you forget your password. 8. Enter your e-mail address. You will receive e-mail notification when new information is available in [address] 9. Click Sign Up. You can now view and download portions of your medical record. 10. Click the Download Summary menu link to download a portable copy of your medical information. If you have questions, please visit the Frequently Asked Questions section of the Findery website. Remember, Findery is NOT to be used for urgent needs. For medical emergencies, dial 911. Now available from your iPhone and Android! Please provide this summary of care documentation to your next provider. Your primary care clinician is listed as Kendy Coleman. If you have any questions after today's visit, please call [phone].

## 2018-01-11 ENCOUNTER — OFFICE VISIT (OUTPATIENT)
Dept: NEUROLOGY | Age: 48
End: 2018-01-11

## 2018-01-11 VITALS
HEART RATE: 101 BPM | RESPIRATION RATE: 18 BRPM | OXYGEN SATURATION: 100 % | DIASTOLIC BLOOD PRESSURE: 93 MMHG | HEIGHT: 67 IN | SYSTOLIC BLOOD PRESSURE: 139 MMHG | BODY MASS INDEX: 31.11 KG/M2 | WEIGHT: 198.2 LBS

## 2018-01-11 DIAGNOSIS — G43.109 MIGRAINE WITH VERTIGO: Primary | ICD-10-CM

## 2018-01-11 DIAGNOSIS — G43.809 OTHER MIGRAINE WITHOUT STATUS MIGRAINOSUS, NOT INTRACTABLE: ICD-10-CM

## 2018-01-11 RX ORDER — TOPIRAMATE 50 MG/1
50 TABLET, FILM COATED ORAL 2 TIMES DAILY
Qty: 180 TAB | Refills: 3 | Status: SHIPPED | OUTPATIENT
Start: 2018-01-11 | End: 2019-02-20 | Stop reason: SDUPTHER

## 2018-01-11 RX ORDER — AMITRIPTYLINE HYDROCHLORIDE 25 MG/1
25 TABLET, FILM COATED ORAL
Qty: 90 TAB | Refills: 3 | Status: SHIPPED | OUTPATIENT
Start: 2018-01-11 | End: 2019-01-28 | Stop reason: SDUPTHER

## 2018-01-11 RX ORDER — BUTALBITAL, ACETAMINOPHEN AND CAFFEINE 50; 325; 40 MG/1; MG/1; MG/1
1 TABLET ORAL
Qty: 30 TAB | Refills: 0 | Status: SHIPPED | OUTPATIENT
Start: 2018-01-11 | End: 2019-02-20 | Stop reason: SDUPTHER

## 2018-01-11 NOTE — MR AVS SNAPSHOT
Visit Information Date & Time Provider Department Dept. Phone Encounter #  
 1/11/2018 11:20 AM Odilia Duke DO [facility] at [address] [id number] Follow-up Instructions Return in about 1 year (around 1/11/2019). Routing History Upcoming Health Maintenance Date Due DTaP/Tdap/Td series (1 - Tdap) 10/25/1991 PAP AKA CERVICAL CYTOLOGY 5/14/2017 Influenza Age 5 to Adult 8/1/2017 Allergies as of 1/11/2018  Review Complete On: 7/19/2017 By: [address] [facility] [place],  Severity Noted Reaction Type Reactions Aspirin High 03/09/2010   Intolerance Unknown (comments) Avoids due to brain aneurysm Current Immunizations  Never Reviewed No immunizations on file. Not reviewed this visit You Were Diagnosed With   
  
 Codes Comments Migraine with vertigo    -  Primary ICD-10-CM: G43.109 ICD-9-CM: 346.00 Other migraine without status migrainosus, not intractable     ICD-10-CM: M20.356 ICD-9-CM: 346.80 Vitals BP Pulse Resp Height(growth percentile) Weight(growth percentile) SpO2  
 (!) 139/93 (BP 1 Location: Left arm, BP Patient Position: Sitting) (!) 101 18 5' 7\" (1.702 m) 198 lb 3.2 oz (89.9 kg) 100% BMI OB Status Smoking Status 31.04 kg/m2 Hysterectomy Former Smoker BMI and BSA Data Body Mass Index Body Surface Area 31.04 kg/m 2 2.06 m 2 Preferred Pharmacy Pharmacy Name Phone [phone] [facility] [phone] Your Updated Medication List  
  
   
This list is accurate as of: 1/11/18 11:46 AM.  Always use your most recent med list.  
  
  
  
  
 amitriptyline 25 mg tablet Commonly known as:  ELAVIL Take 1 Tab by mouth nightly. butalbital-acetaminophen-caffeine -40 mg per tablet Commonly known as:  Carmelia Solders  
 Take 1 Tab by mouth every six (6) hours as needed for Headache. Do not exceed 6 tabs a day CLARITIN 10 mg tablet Generic drug:  loratadine Take 10 mg by mouth. HYDROcodone-acetaminophen 5-325 mg per tablet Commonly known as:  Jackelyn Elias Take  by mouth.  
  
 methylPREDNISolone 4 mg tablet Commonly known as:  Marden Lisa As directed  
  
 ondansetron 4 mg disintegrating tablet Commonly known as:  ZOFRAN ODT Take 1 Tab by mouth every eight (8) hours as needed for Nausea. topiramate 50 mg tablet Commonly known as:  TOPAMAX Take 1 Tab by mouth two (2) times a day. Indications: MIGRAINE PREVENTION  
  
 TYLENOL EXTRA STRENGTH 500 mg tablet Generic drug:  acetaminophen Take  by mouth every six (6) hours as needed for Pain. ZyrTEC 10 mg tablet Generic drug:  cetirizine Take 10 mg by mouth daily. Indications: SEASONAL ALLERGIC RHINITIS Prescriptions Sent to Pharmacy Refills  
 amitriptyline (ELAVIL) 25 mg tablet 3 Sig: Take 1 Tab by mouth nightly. Class: Normal  
 Pharmacy: [address] Ph #: [phone] Route: Oral  
 topiramate (TOPAMAX) 50 mg tablet 3 Sig: Take 1 Tab by mouth two (2) times a day. Indications: MIGRAINE PREVENTION Class: Normal  
 Pharmacy: [address] Ph #: [phone] Route: Oral  
 butalbital-acetaminophen-caffeine (FIORICET, ESGIC) -40 mg per tablet 0 Sig: Take 1 Tab by mouth every six (6) hours as needed for Headache. Do not exceed 6 tabs a day Class: Normal  
 Pharmacy: [address] Ph #: [phone] Route: Oral  
  
Follow-up Instructions Return in about 1 year (around 1/11/2019). Patient Instructions A Healthy Lifestyle: Care Instructions Your Care Instructions A healthy lifestyle can help you feel good, stay at a healthy weight, and have plenty of energy for both work and play. A healthy lifestyle is something you can share with your whole family. A healthy lifestyle also can lower your risk for serious health problems, such as high blood pressure, heart disease, and diabetes. You can follow a few steps listed below to improve your health and the health of your family. Follow-up care is a key part of your treatment and safety. Be sure to make and go to all appointments, and call your doctor if you are having problems. It's also a good idea to know your test results and keep a list of the medicines you take. How can you care for yourself at home? · Do not eat too much sugar, fat, or fast foods. You can still have dessert and treats now and then. The goal is moderation. · Start small to improve your eating habits. Pay attention to portion sizes, drink less juice and soda pop, and eat more fruits and vegetables. ¨ Eat a healthy amount of food. A 3-ounce serving of meat, for example, is about the size of a deck of cards. Fill the rest of your plate with vegetables and whole grains. ¨ Limit the amount of soda and sports drinks you have every day. Drink more water when you are thirsty. ¨ Eat at least 5 servings of fruits and vegetables every day. It may seem like a lot, but it is not hard to reach this goal. A serving or helping is 1 piece of fruit, 1 cup of vegetables, or 2 cups of leafy, raw vegetables. Have an apple or some carrot sticks as an afternoon snack instead of a candy bar. Try to have fruits and/or vegetables at every meal. 
· Make exercise part of your daily routine. You may want to start with simple activities, such as walking, bicycling, or slow swimming. Try to be active 30 to 60 minutes every day. You do not need to do all 30 to 60 minutes all at once.  For example, you can exercise 3 times a day for 10 or 20 minutes. Moderate exercise is safe for most people, but it is always a good idea to talk to your doctor before starting an exercise program. 
· Keep moving. Kendrick Jarvis the lawn, work in the garden, or [employer]. Take the stairs instead of the elevator at work. · If you smoke, quit. People who smoke have an increased risk for heart attack, stroke, cancer, and other lung illnesses. Quitting is hard, but there are ways to boost your chance of quitting tobacco for good. ¨ Use nicotine gum, patches, or lozenges. ¨ Ask your doctor about stop-smoking programs and medicines. ¨ Keep trying. In addition to reducing your risk of diseases in the future, you will notice some benefits soon after you stop using tobacco. If you have shortness of breath or asthma symptoms, they will likely get better within a few weeks after you quit. · Limit how much alcohol you drink. Moderate amounts of alcohol (up to 2 drinks a day for men, 1 drink a day for women) are okay. But drinking too much can lead to liver problems, high blood pressure, and other health problems. Family health If you have a family, there are many things you can do together to improve your health. · Eat meals together as a family as often as possible. · Eat healthy foods. This includes fruits, vegetables, lean meats and dairy, and whole grains. · Include your family in your fitness plan. Most people think of activities such as jogging or tennis as the way to fitness, but there are many ways you and your family can be more active. Anything that makes you breathe hard and gets your heart pumping is exercise. Here are some tips: 
¨ Walk to do errands or to take your child to school or the bus. ¨ Go for a family bike ride after dinner instead of watching TV. Where can you learn more? Go to http://kian-henry.info/. Enter L856 in the search box to learn more about \"A Healthy Lifestyle: Care Instructions. \" Current as of: May 12, 2017 Content Version: 11.4 © 4455-7972 Healthwise, [employer]. Care instructions adapted under license by [employer] (which disclaims liability or warranty for this information). If you have questions about a medical condition or this instruction, always ask your healthcare professional. [address] any warranty or liability for your use of this information. Introducing [facility] HEALTH SERVICES! [employer] introduces The Efficiency Network (TEN) patient portal. Now you can access parts of your medical record, email your doctor's office, and request medication refills online. 1. In your internet browser, go to [email or web address] 2. Click on the First Time User? Click Here link in the Sign In box. You will see the New Member Sign Up page. 3. Enter your The Efficiency Network (TEN) Access Code exactly as it appears below. You will not need to use this code after youve completed the sign-up process. If you do not sign up before the expiration date, you must request a new code. · The Efficiency Network (TEN) Access Code: [email or web address] Expires: 4/11/2018 11:04 AM 
 
4. Enter the last four digits of your Social Security Number (xxxx) and Date of Birth (mm/dd/yyyy) as indicated and click Submit. You will be taken to the next sign-up page. 5. Create a The Efficiency Network (TEN) ID. This will be your The Efficiency Network (TEN) login ID and cannot be changed, so think of one that is secure and easy to remember. 6. Create a The Efficiency Network (TEN) password. You can change your password at any time. 7. Enter your Password Reset Question and Answer. This can be used at a later time if you forget your password. 8. Enter your e-mail address. You will receive e-mail notification when new information is available in [address] 9. Click Sign Up. You can now view and download portions of your medical record. 10. Click the Download Summary menu link to download a portable copy of your medical information. If you have questions, please visit the Frequently Asked Questions section of the Inotremt website. Remember, Inuvo is NOT to be used for urgent needs. For medical emergencies, dial 911. Now available from your iPhone and Android! Please provide this summary of care documentation to your next provider. Your primary care clinician is listed as Sharlene Koenig. If you have any questions after today's visit, please call [phone].

## 2018-01-11 NOTE — PROGRESS NOTES
Chief Complaint   Patient presents with    Follow-up     Migraine and vertigo       HPI    49-year-old woman here to follow-up on migrainous headaches with vertiginous symptoms. History of intracranial aneurysm clipping. She has been well controlled utilizing amitriptyline 25 mg and topiramate 50 mg twice daily. Paresthesias in her fingertips have resolved. No longer an issue. She still gets breakthrough symptoms occasionally whenever she has acute illness such as a sinus infection. Review of Systems   Neurological: Positive for dizziness and headaches. All other systems reviewed and are negative. Past Medical History:   Diagnosis Date    Anemia NEC     Aneurysm     Delivery normal     x3    Dermatitis     Headache(784.0)     HX OTHER MEDICAL     Stroke ([facility] [address]     brain anuerysm, 1998    Vertigo      Family History   Problem Relation Age of Onset    Diabetes Mother     Hypertension Mother     Heart Disease Maternal Aunt      Social History     Social History    Marital status:      Spouse name: N/A    Number of children: N/A    Years of education: N/A     Occupational History    Not on file. Social History Main Topics    Smoking status: Former Smoker     Packs/day: 1.00     Years: 5.00     Quit date: 3/9/2004    Smokeless tobacco: Never Used    Alcohol use Yes      Comment: rarely    Drug use: No    Sexual activity: Not Currently     Partners: Male     Birth control/ protection: None     Other Topics Concern    Not on file     Social History Narrative     Allergies   Allergen Reactions    Aspirin Unknown (comments)     Avoids due to brain aneurysm         Current Outpatient Prescriptions   Medication Sig    amitriptyline (ELAVIL) 25 mg tablet Take 1 Tab by mouth nightly.  topiramate (TOPAMAX) 50 mg tablet Take 1 Tab by mouth two (2) times a day.  Indications: MIGRAINE PREVENTION    butalbital-acetaminophen-caffeine (FIORICET, ESGIC) -40 mg per tablet Take 1 Tab by mouth every six (6) hours as needed for Headache. Do not exceed 6 tabs a day    loratadine (CLARITIN) 10 mg tablet Take 10 mg by mouth.  HYDROcodone-acetaminophen (NORCO) 5-325 mg per tablet Take  by mouth.  methylPREDNISolone (MEDROL DOSEPACK) 4 mg tablet As directed    cetirizine (ZYRTEC) 10 mg tablet Take 10 mg by mouth daily. Indications: SEASONAL ALLERGIC RHINITIS    ondansetron (ZOFRAN ODT) 4 mg disintegrating tablet Take 1 Tab by mouth every eight (8) hours as needed for Nausea.  acetaminophen (TYLENOL EXTRA STRENGTH) 500 mg tablet Take  by mouth every six (6) hours as needed for Pain. No current facility-administered medications for this visit. Neurologic Exam     Mental Status        WD/WN adult in NAD, normal grooming  VSS  A&O x 3    PERRL, nonicteric  Face is symmetric, tongue midline  Speech is fluent and clear  No limb ataxia. No abnl movements. Moving all extemities spontaneously and symmetric  Normal gait    [facility] RRR  Lungs nonlabored  Skin is warm and dry         Visit Vitals    BP (!) 139/93 (BP 1 Location: Left arm, BP Patient Position: Sitting)    Pulse (!) 101    Resp 18    Ht 5' 7\" (1.702 m)    Wt 89.9 kg (198 lb 3.2 oz)    SpO2 100%    BMI 31.04 kg/m2       Assessment and Plan   Diagnoses and all orders for this visit:    1. Migraine with vertigo    2. Other migraine without status migrainosus, not intractable  -     butalbital-acetaminophen-caffeine (FIORICET, ESGIC) -40 mg per tablet; Take 1 Tab by mouth every six (6) hours as needed for Headache. Do not exceed 6 tabs a day    Other orders  -     amitriptyline (ELAVIL) 25 mg tablet; Take 1 Tab by mouth nightly. -     topiramate (TOPAMAX) 50 mg tablet; Take 1 Tab by mouth two (2) times a day. Indications: MIGRAINE PREVENTION      52year-old woman with migrainous vertiginous symptoms doing well on the current regimen.   She does get breakthrough events at times which respond to Fioricet used on very infrequent occasions. Continue the current dosing and management in general.  I will see her annually or sooner if needed.         [facility], [address]  Diplomate ABPN

## 2018-01-11 NOTE — PATIENT INSTRUCTIONS

## 2019-01-28 NOTE — TELEPHONE ENCOUNTER
Requested Prescriptions     Pending Prescriptions Disp Refills    amitriptyline (ELAVIL) 25 mg tablet 90 Tab 3     Sig: Take 1 Tab by mouth nightly.

## 2019-01-30 RX ORDER — AMITRIPTYLINE HYDROCHLORIDE 25 MG/1
25 TABLET, FILM COATED ORAL
Qty: 30 TAB | Refills: 0 | Status: SHIPPED | OUTPATIENT
Start: 2019-01-30 | End: 2019-02-20 | Stop reason: SDUPTHER

## 2019-02-20 ENCOUNTER — OFFICE VISIT (OUTPATIENT)
Dept: NEUROLOGY | Age: 49
End: 2019-02-20

## 2019-02-20 VITALS
BODY MASS INDEX: 32.02 KG/M2 | HEIGHT: 67 IN | RESPIRATION RATE: 18 BRPM | WEIGHT: 204 LBS | SYSTOLIC BLOOD PRESSURE: 130 MMHG | HEART RATE: 110 BPM | DIASTOLIC BLOOD PRESSURE: 86 MMHG | OXYGEN SATURATION: 98 %

## 2019-02-20 DIAGNOSIS — G43.109 MIGRAINE WITH VERTIGO: Primary | ICD-10-CM

## 2019-02-20 DIAGNOSIS — G43.809 OTHER MIGRAINE WITHOUT STATUS MIGRAINOSUS, NOT INTRACTABLE: ICD-10-CM

## 2019-02-20 RX ORDER — BUTALBITAL, ACETAMINOPHEN AND CAFFEINE 50; 325; 40 MG/1; MG/1; MG/1
1 TABLET ORAL
Qty: 30 TAB | Refills: 0 | Status: SHIPPED | OUTPATIENT
Start: 2019-02-20 | End: 2020-02-03 | Stop reason: SDUPTHER

## 2019-02-20 RX ORDER — AMITRIPTYLINE HYDROCHLORIDE 25 MG/1
25 TABLET, FILM COATED ORAL
Qty: 90 TAB | Refills: 3 | Status: SHIPPED | OUTPATIENT
Start: 2019-02-20 | End: 2020-02-03 | Stop reason: SDUPTHER

## 2019-02-20 RX ORDER — TOPIRAMATE 50 MG/1
50 TABLET, FILM COATED ORAL 2 TIMES DAILY
Qty: 180 TAB | Refills: 3 | Status: SHIPPED | OUTPATIENT
Start: 2019-02-20 | End: 2020-02-03 | Stop reason: SDUPTHER

## 2019-02-20 NOTE — PROGRESS NOTES
Chief Complaint Patient presents with  
 Headache HPI 
80-year-old woman here to follow-up. I began seeing her for vertigo which we now treat as migraine variant with vertigo. She takes topiramate and amitriptyline daily with good results. She has occasional breakthrough symptoms that respond to Fioricet and she might have an event every few months. She is doing well in general.  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 Sleep is good. She has some mild dry mouth from amitriptyline. Paresthesias from Topamax have resolved. Review of Systems Eyes: Negative for double vision. Neurological: Positive for headaches. Negative for loss of consciousness. All other systems reviewed and are negative. Past Medical History:  
Diagnosis Date  Anemia NEC  Aneurysm  Delivery normal   
 x3  Dermatitis  Headache   
 Headache(784.0) [address]  Stroke ([facility] [address] brain anuerysm, 1998  Vertigo Family History Problem Relation Age of Onset  Diabetes Mother  Hypertension Mother  Heart Disease Maternal Aunt Social History Socioeconomic History  Marital status:  Spouse name: Not on file  Number of children: Not on file  Years of education: Not on file  Highest education level: Not on file Social Needs  Financial resource strain: Not on file  Food insecurity - worry: Not on file  Food insecurity - inability: Not on file  Transportation needs - medical: Not on file  Transportation needs - non-medical: Not on file Occupational History  Not on file Tobacco Use  Smoking status: Former Smoker Packs/day: 1.00 Years: 5.00 Pack years: 5.00 Last attempt to quit: 3/9/2004 Years since quittin.9  Smokeless tobacco: Never Used Substance and Sexual Activity  Alcohol use: Yes Comment: rarely  Drug use: No  
 Sexual activity: Not Currently Partners: Male Birth control/protection: None Other Topics Concern  Not on file Social History Narrative  Not on file Allergies Allergen Reactions  Aspirin Unknown (comments) Avoids due to brain aneurysm Current Outpatient Medications Medication Sig  topiramate (TOPAMAX) 50 mg tablet Take 1 Tab by mouth two (2) times a day.  amitriptyline (ELAVIL) 25 mg tablet Take 1 Tab by mouth nightly.  butalbital-acetaminophen-caffeine (FIORICET, ESGIC) -40 mg per tablet Take 1 Tab by mouth every six (6) hours as needed for Headache. Do not exceed 6 tabs a day  loratadine (CLARITIN) 10 mg tablet Take 10 mg by mouth.  ondansetron (ZOFRAN ODT) 4 mg disintegrating tablet Take 1 Tab by mouth every eight (8) hours as needed for Nausea.  acetaminophen (TYLENOL EXTRA STRENGTH) 500 mg tablet Take  by mouth every six (6) hours as needed for Pain.  HYDROcodone-acetaminophen (NORCO) 5-325 mg per tablet Take  by mouth.  cetirizine (ZYRTEC) 10 mg tablet Take 10 mg by mouth daily. Indications: SEASONAL ALLERGIC RHINITIS No current facility-administered medications for this visit. Neurologic Exam  
 
Mental Status WD/WN adult in NAD, normal grooming VSS 
A&O x 3 PERRL, nonicteric Face is symmetric, tongue midline Speech is fluent and clear No limb ataxia. No abnl movements. Moving all extemities spontaneously and symmetric Normal gait CVS RRR Lungs nonlabored Skin is warm and dry Visit Vitals /86 Pulse (!) 110 Resp 18 Ht 5' 7\" (1.702 m) Wt 92.5 kg (204 lb) SpO2 98% BMI 31.95 kg/m² Assessment and Plan Diagnoses and all orders for this visit: 
 
1. Migraine with vertigo 2. Other migraine without status migrainosus, not intractable -     butalbital-acetaminophen-caffeine (FIORICET, ESGIC) -40 mg per tablet; Take 1 Tab by mouth every six (6) hours as needed for Headache. Do not exceed 6 tabs a day Other orders -     topiramate (TOPAMAX) 50 mg tablet; Take 1 Tab by mouth two (2) times a day. -     amitriptyline (ELAVIL) 25 mg tablet; Take 1 Tab by mouth nightly. 42-year-old woman with migraine variant with vertigo with good control clinically utilizing amitriptyline and topiramate. No changes to her medication therapy since it is effective. Fioricet as needed. I will see her annually or sooner if needed. I reviewed and decided to continue the current medications. [address] [facility],  
NEUROLOGIST Diplomate ABPN

## 2019-02-20 NOTE — PROGRESS NOTES
Pt here to f/u on headaches. Maybe 1 headache a week. The last one lasted about 8 hours. Depression screen completed.

## 2020-02-03 ENCOUNTER — OFFICE VISIT (OUTPATIENT)
Dept: NEUROLOGY | Age: 50
End: 2020-02-03

## 2020-02-03 VITALS
DIASTOLIC BLOOD PRESSURE: 82 MMHG | SYSTOLIC BLOOD PRESSURE: 124 MMHG | WEIGHT: 202 LBS | TEMPERATURE: 96.8 F | OXYGEN SATURATION: 98 % | BODY MASS INDEX: 31.71 KG/M2 | HEART RATE: 108 BPM | RESPIRATION RATE: 18 BRPM | HEIGHT: 67 IN

## 2020-02-03 DIAGNOSIS — G43.109 MIGRAINE WITH VERTIGO: Primary | ICD-10-CM

## 2020-02-03 DIAGNOSIS — G43.809 OTHER MIGRAINE WITHOUT STATUS MIGRAINOSUS, NOT INTRACTABLE: ICD-10-CM

## 2020-02-03 RX ORDER — TOPIRAMATE 50 MG/1
50 TABLET, FILM COATED ORAL 2 TIMES DAILY
Qty: 180 TAB | Refills: 5 | Status: SHIPPED | OUTPATIENT
Start: 2020-02-03 | End: 2021-01-28 | Stop reason: SDUPTHER

## 2020-02-03 RX ORDER — BUTALBITAL, ACETAMINOPHEN AND CAFFEINE 50; 325; 40 MG/1; MG/1; MG/1
1 TABLET ORAL
Qty: 30 TAB | Refills: 1 | Status: SHIPPED | OUTPATIENT
Start: 2020-02-03 | End: 2021-01-28 | Stop reason: SDUPTHER

## 2020-02-03 RX ORDER — AMITRIPTYLINE HYDROCHLORIDE 25 MG/1
25 TABLET, FILM COATED ORAL
Qty: 90 TAB | Refills: 5 | Status: SHIPPED | OUTPATIENT
Start: 2020-02-03 | End: 2021-01-28 | Stop reason: SDUPTHER

## 2020-02-03 NOTE — PROGRESS NOTES
Date:  20     Name:  Avtar Liu  :  1970  MRN:  [id number]     PCP:  Rochelle Armenta MD    Chief Complaint   Patient presents with    Migraine     Patient states had a couple of MHA over the last month with 1 lasting 1 1/2 had to stay home and take fiorcet    Follow-up       HISTORY OF PRESENT ILLNESS: Follow-up visit for migraines. Patient's migraines have been maintained on Topamax 25mg twice daily, amitriptyline 25 mg nightly. Reports 1 migraine day a month. She reports that she did have one severe headache that required her to stay home. She reports the Fioricet aborted that headache but it did take several hours. She continues to abort her headaches with the Fioricet and this seems to be working well. She has several new stressors in her her job and she just laid down her dog. Other than that she has no concerns and is content with her headache management . Current Outpatient Medications   Medication Sig    butalbital-acetaminophen-caffeine (FIORICET, ESGIC) -40 mg per tablet Take 1 Tab by mouth every six (6) hours as needed for Headache. Do not exceed 6 tabs a day    amitriptyline (ELAVIL) 25 mg tablet Take 1 Tab by mouth nightly.  topiramate (TOPAMAX) 50 mg tablet Take 1 Tab by mouth two (2) times a day. Indications: migraine prevention    loratadine (CLARITIN) 10 mg tablet Take 10 mg by mouth.  HYDROcodone-acetaminophen (NORCO) 5-325 mg per tablet Take  by mouth.  acetaminophen (TYLENOL EXTRA STRENGTH) 500 mg tablet Take  by mouth every six (6) hours as needed for Pain.  cetirizine (ZYRTEC) 10 mg tablet Take 10 mg by mouth daily. Indications: SEASONAL ALLERGIC RHINITIS     No current facility-administered medications for this visit.       Allergies   Allergen Reactions    Aspirin Unknown (comments)     Avoids due to brain aneurysm     Past Medical History:   Diagnosis Date    Anemia NEC     Aneurysm     Delivery normal     x3    Dermatitis     Headache     Headache(784.0)     HX OTHER MEDICAL     Stroke ([facility] [address]     brain anuerysm, 1998    Vertigo      Past Surgical History:   Procedure Laterality Date    ANEURYSM, INTRACRAN, SIMPLE SURG      HX GYN      cervical biopsy    HX HYSTERECTOMY      HX ORTHOPAEDIC      foot surgery     Social History     Socioeconomic History    Marital status:      Spouse name: Not on file    Number of children: Not on file    Years of education: Not on file    Highest education level: Not on file   Occupational History    Not on file   Social Needs    Financial resource strain: Not on file    Food insecurity:     Worry: Not on file     Inability: Not on file    Transportation needs:     Medical: Not on file     Non-medical: Not on file   Tobacco Use    Smoking status: Former Smoker     Packs/day: 1.00     Years: 5.00     Pack years: 5.00     Last attempt to quit: 3/9/2004     Years since quitting: 15.9    Smokeless tobacco: Never Used   Substance and Sexual Activity    Alcohol use: Yes     Comment: rarely    Drug use: No    Sexual activity: Not Currently     Partners: Male     Birth control/protection: None   Lifestyle    Physical activity:     Days per week: Not on file     Minutes per session: Not on file    Stress: Not on file   Relationships    Social connections:     Talks on phone: Not on file     Gets together: Not on file     Attends Oriental orthodox service: Not on file     Active member of club or organization: Not on file     Attends meetings of clubs or organizations: Not on file     Relationship status: Not on file    Intimate partner violence:     Fear of current or ex partner: Not on file     Emotionally abused: Not on file     Physically abused: Not on file     Forced sexual activity: Not on file   Other Topics Concern    Not on file   Social History Narrative    Not on file     Family History   Problem Relation Age of Onset    Diabetes Mother     Hypertension Mother     Heart Disease Maternal Aunt        PHYSICAL EXAMINATION:    Visit Vitals  /82 (BP 1 Location: Right arm, BP Patient Position: Sitting)   Pulse (!) 108   Temp 96.8 °F (36 °C) (Oral)   Resp 18   Ht 5' 7\" (1.702 m)   Wt 91.6 kg (202 lb)   SpO2 98%   BMI 31.64 kg/m²     General: Well defined, nourished, and groomed individual in no acute distress. Neck: Supple, nontender, no bruits, no pain with resistance to active range of motion. Heart: Regular rate and rhythm, no murmurs, rub, or gallop. Normal S1S2. Lungs: Clear to auscultation bilaterally with equal chest expansion, no cough, no wheeze  Musculoskeletal: Extremities revealed no edema and had full range of motion of joints. Psych: Good mood and bright affect      NEUROLOGICAL EXAMINATION:   Mental Status: Alert and oriented to person, place, and time       Cranial Nerves:   II, III, IV, VI: Visual acuity grossly intact. Visual fields are normal.   Pupils are equal, round, and reactive to light and accommodation. Extra-ocular movements are full and fluid. Fundoscopic exam was benign, no ptosis or nystagmus. V-XII: Hearing is grossly intact. Facial features are symmetric, with normal sensation and strength. The palate rises symmetrically and the tongue protrudes midline. Sternocleidomastoids 5/5.       Motor Examination: Normal tone, bulk, and strength, 5/5 muscle strength throughout.       Coordination: No resting or intention tremor      Gait and Station: Steady while walking. Normal arm swing. No pronator drift. No muscle wasting or fasiculations noted.       Reflexes: DTRs 2+ throughout. ASSESSMENT AND PLAN    ICD-10-CM ICD-9-CM    1. Migraine with vertigo G43.109 346.80 butalbital-acetaminophen-caffeine (FIORICET, ESGIC) -40 mg per tablet     780.4 amitriptyline (ELAVIL) 25 mg tablet      topiramate (TOPAMAX) 50 mg tablet   2.  Other migraine without status migrainosus, not intractable G43.809 346.80 butalbital-acetaminophen-caffeine (FIORICET, ESGIC) -40 mg per tablet   52year-old with migraine accompanied with vertigo . migraines have been stable on topiramate, amitriptyline. We will continue prescription as prescribed. Continue to abort headaches with Fioricet. Follow-up in the   This note will not be viewable in MyChart.   Pina Larsen, NP

## 2020-02-03 NOTE — LETTER
NOTIFICATION RETURN TO WORK / SCHOOL 
 
2/3/2020 3:02 PM 
 
Ms. Oleg Avila [address] [place] [id number] To Whom It May Concern: 
 
Oleg Avila is currently under the care of [facility]. She will return to work/school on: 02/03/2020 If there are questions or concerns please have the patient contact our office. Sincerely, Yamilka Stoll NP

## 2020-02-03 NOTE — PROGRESS NOTES
Chief Complaint   Patient presents with    Migraine     Patient states had a couple of MHA over the last month with 1 lasting 1 1/2 had to stay home and take fiorcet    Follow-up     Visit Vitals  /82 (BP 1 Location: Right arm, BP Patient Position: Sitting)   Pulse (!) 108   Temp 96.8 °F (36 °C) (Oral)   Resp 18   Ht 5' 7\" (1.702 m)   Wt 91.6 kg (202 lb)   SpO2 98%   BMI 31.64 kg/m²

## 2021-01-28 ENCOUNTER — OFFICE VISIT (OUTPATIENT)
Dept: NEUROLOGY | Age: 51
End: 2021-01-28
Payer: COMMERCIAL

## 2021-01-28 VITALS
BODY MASS INDEX: 32.33 KG/M2 | WEIGHT: 206 LBS | HEART RATE: 78 BPM | SYSTOLIC BLOOD PRESSURE: 120 MMHG | DIASTOLIC BLOOD PRESSURE: 70 MMHG | HEIGHT: 67 IN | RESPIRATION RATE: 16 BRPM | OXYGEN SATURATION: 98 %

## 2021-01-28 DIAGNOSIS — G43.809 OTHER MIGRAINE WITHOUT STATUS MIGRAINOSUS, NOT INTRACTABLE: ICD-10-CM

## 2021-01-28 DIAGNOSIS — G43.109 MIGRAINE WITH VERTIGO: Primary | ICD-10-CM

## 2021-01-28 PROCEDURE — 99213 OFFICE O/P EST LOW 20 MIN: CPT | Performed by: NURSE PRACTITIONER

## 2021-01-28 RX ORDER — TOPIRAMATE 50 MG/1
50 TABLET, FILM COATED ORAL 2 TIMES DAILY
Qty: 180 TAB | Refills: 3 | Status: SHIPPED | OUTPATIENT
Start: 2021-01-28 | End: 2022-01-05 | Stop reason: SDUPTHER

## 2021-01-28 RX ORDER — AMITRIPTYLINE HYDROCHLORIDE 25 MG/1
25 TABLET, FILM COATED ORAL
Qty: 90 TAB | Refills: 3 | Status: SHIPPED | OUTPATIENT
Start: 2021-01-28 | End: 2022-01-05 | Stop reason: SDUPTHER

## 2021-01-28 RX ORDER — BUTALBITAL, ACETAMINOPHEN AND CAFFEINE 50; 325; 40 MG/1; MG/1; MG/1
1 TABLET ORAL
Qty: 20 TAB | Refills: 1 | Status: SHIPPED | OUTPATIENT
Start: 2021-01-28 | End: 2021-04-01

## 2021-01-28 NOTE — PROGRESS NOTES
Date:  21     Name:  Jonh Vanegas  :  1970  MRN:  [id number]     PCP:  Ching Salazar MD    Chief Complaint   Patient presents with    Migraine       HISTORY OF PRESENT ILLNESS:Follow up visit for vertigo. Currently being maintain on topiramate and amitriptyline. She has not had any vertigo. She will occasionally get migraines. Last migraine was in December and it took 2 days to get rid of it. Overall doing well. She is  using Fioricet to abort her migraines. Review of Systems   Constitutional: Negative for fever. Eyes: Negative for blurred vision, double vision and photophobia. Neurological: Positive for headaches. All other systems reviewed and are negative. Current Outpatient Medications   Medication Sig    amitriptyline (ELAVIL) 25 mg tablet Take 1 Tab by mouth nightly.  topiramate (TOPAMAX) 50 mg tablet Take 1 Tab by mouth two (2) times a day. Indications: migraine prevention    butalbital-acetaminophen-caffeine (FIORICET, ESGIC) -40 mg per tablet Take 1 Tab by mouth every six (6) hours as needed for Headache. Do not exceed 6 tabs a day    ubrogepant Correne Promise) 100 mg tablet Take 1 Tab by mouth once as needed for Migraine for up to 1 dose.  loratadine (CLARITIN) 10 mg tablet Take 10 mg by mouth.  acetaminophen (TYLENOL EXTRA STRENGTH) 500 mg tablet Take  by mouth every six (6) hours as needed for Pain.  HYDROcodone-acetaminophen (NORCO) 5-325 mg per tablet Take  by mouth.  cetirizine (ZYRTEC) 10 mg tablet Take 10 mg by mouth daily. Indications: SEASONAL ALLERGIC RHINITIS     No current facility-administered medications for this visit.       Allergies   Allergen Reactions    Aspirin Unknown (comments)     Avoids due to brain aneurysm     Past Medical History:   Diagnosis Date    Anemia NEC     Aneurysm     Delivery normal     x3    Dermatitis     Headache     Headache(784.0)     HX OTHER MEDICAL     Stroke ([address]     brain anuerysm,     Vertigo      Past Surgical History:   Procedure Laterality Date    HX GYN      cervical biopsy    HX HYSTERECTOMY      HX ORTHOPAEDIC      foot surgery    [place] ANEURYSM, INTRACRAN, SIMPLE SURG       Social History     Socioeconomic History    Marital status:      Spouse name: Not on file    Number of children: Not on file    Years of education: Not on file    Highest education level: Not on file   Occupational History    Not on file   Social Needs    Financial resource strain: Not on file    Food insecurity     Worry: Not on file     Inability: Not on file    Transportation needs     Medical: Not on file     Non-medical: Not on file   Tobacco Use    Smoking status: Former Smoker     Packs/day: 1.00     Years: 5.00     Pack years: 5.00     Quit date: 3/9/2004     Years since quittin.9    Smokeless tobacco: Never Used   Substance and Sexual Activity    Alcohol use: Yes     Comment: rarely    Drug use: No    Sexual activity: Not Currently     Partners: Male     Birth control/protection: None   Lifestyle    Physical activity     Days per week: Not on file     Minutes per session: Not on file    Stress: Not on file   Relationships    Social connections     Talks on phone: Not on file     Gets together: Not on file     Attends Episcopal service: Not on file     Active member of club or organization: Not on file     Attends meetings of clubs or organizations: Not on file     Relationship status: Not on file    Intimate partner violence     Fear of current or ex partner: Not on file     Emotionally abused: Not on file     Physically abused: Not on file     Forced sexual activity: Not on file   Other Topics Concern    Not on file   Social History Narrative    Not on file     Family History   Problem Relation Age of Onset    Diabetes Mother     Hypertension Mother     Heart Disease Maternal Aunt        PHYSICAL EXAMINATION:    Visit Vitals  /70 (BP 1 Location: Left upper arm, BP Patient Position: Supine)   Pulse 78   Resp 16   Ht 5' 7\" (1.702 m)   Wt 206 lb (93.4 kg)   SpO2 98%   BMI 32.26 kg/m²       Neurologically, she is awake, alert, and oriented with normal speech and language. Her cognition is normal.    Intact cranial nerves 2-12. No nystagmus. Visual fields full to confrontation. Disk margins are flat bilaterally. She has normal bulk and tone. She has no abnormal movement. She has no pronation or drift. She generates full strength in the upper and lower extremities to direct confrontational testing. Reflexes are symmetrical in the upper and lower extremities bilaterally. No pathologic reflexes are elicited. .  Finger nose finger and rapid alternating movements are normal.  Steady gait. No sensory deficit to primary modalities. ASSESSMENT AND PLAN    ICD-10-CM ICD-9-CM    1. Migraine with vertigo  G43.109 346.80 amitriptyline (ELAVIL) 25 mg tablet     780.4 topiramate (TOPAMAX) 50 mg tablet      butalbital-acetaminophen-caffeine (FIORICET, ESGIC) -40 mg per tablet   2. Other migraine without status migrainosus, not intractable  G43.809 346.80 butalbital-acetaminophen-caffeine (FIORICET, ESGIC) -40 mg per tablet     1. Migraine with vertigo   -Continue Topamax 50 mg BID daily   - continue 25 mg daily   - amitriptyline (ELAVIL) 25 mg tablet; Take 1 Tab by mouth nightly. Dispense: 90 Tab; Refill: 3  - topiramate (TOPAMAX) 50 mg tablet; Take 1 Tab by mouth two (2) times a day. Indications: migraine prevention  Dispense: 180 Tab; Refill: 3  - butalbital-acetaminophen-caffeine (FIORICET, ESGIC) -40 mg per tablet; Take 1 Tab by mouth every six (6) hours as needed for Headache. Do not exceed 6 tabs a day  Dispense: 20 Tab; Refill: 1    2. Other migraine without status migrainosus, not intractable  - butalbital-acetaminophen-caffeine (FIORICET, ESGIC) -40 mg per tablet; Take 1 Tab by mouth every six (6) hours as needed for Headache.  Do not exceed 6 tabs a day  Dispense: 20 Tab;  Refill: 1   Follow up in 1 year   Joseph Meneses NP

## 2021-03-30 DIAGNOSIS — G43.809 OTHER MIGRAINE WITHOUT STATUS MIGRAINOSUS, NOT INTRACTABLE: ICD-10-CM

## 2021-03-30 DIAGNOSIS — G43.109 MIGRAINE WITH VERTIGO: ICD-10-CM

## 2021-04-01 RX ORDER — BUTALBITAL, ACETAMINOPHEN AND CAFFEINE 50; 325; 40 MG/1; MG/1; MG/1
TABLET ORAL
Qty: 20 TAB | Refills: 1 | Status: SHIPPED | OUTPATIENT
Start: 2021-04-01

## 2021-11-01 ENCOUNTER — NURSE TRIAGE (OUTPATIENT)
Dept: OTHER | Facility: CLINIC | Age: 51
End: 2021-11-01

## 2021-11-01 ENCOUNTER — HOSPITAL ENCOUNTER (INPATIENT)
Age: 51
LOS: 3 days | Discharge: HOME | DRG: 812 | End: 2021-11-04
Attending: EMERGENCY MEDICINE | Admitting: INTERNAL MEDICINE
Payer: COMMERCIAL

## 2021-11-01 ENCOUNTER — APPOINTMENT (OUTPATIENT)
Dept: CT IMAGING | Age: 51
DRG: 812 | End: 2021-11-01
Attending: INTERNAL MEDICINE
Payer: COMMERCIAL

## 2021-11-01 ENCOUNTER — APPOINTMENT (OUTPATIENT)
Dept: GENERAL RADIOLOGY | Age: 51
DRG: 812 | End: 2021-11-01
Attending: EMERGENCY MEDICINE
Payer: COMMERCIAL

## 2021-11-01 DIAGNOSIS — D50.9 HYPOCHROMIC MICROCYTIC ANEMIA: Primary | ICD-10-CM

## 2021-11-01 PROBLEM — D64.9 SEVERE ANEMIA: Status: ACTIVE | Noted: 2021-11-01

## 2021-11-01 LAB
ALBUMIN SERPL-MCNC: 3.1 G/DL (ref 3.5–5)
ALBUMIN/GLOB SERPL: 0.8 {RATIO} (ref 1.1–2.2)
ALP SERPL-CCNC: 122 U/L (ref 45–117)
ALT SERPL-CCNC: 18 U/L (ref 12–78)
ANION GAP SERPL CALC-SCNC: 9 MMOL/L (ref 5–15)
AST SERPL-CCNC: 12 U/L (ref 15–37)
ATRIAL RATE: 103 BPM
BASOPHILS # BLD: 0 K/UL (ref 0–0.1)
BASOPHILS NFR BLD: 0 % (ref 0–1)
BILIRUB SERPL-MCNC: 0.3 MG/DL (ref 0.2–1)
BUN SERPL-MCNC: 9 MG/DL (ref 6–20)
BUN/CREAT SERPL: 14 (ref 12–20)
CALCIUM SERPL-MCNC: 8.5 MG/DL (ref 8.5–10.1)
CALCULATED P AXIS, ECG09: 38 DEGREES
CALCULATED R AXIS, ECG10: 26 DEGREES
CALCULATED T AXIS, ECG11: 160 DEGREES
CHLORIDE SERPL-SCNC: 108 MMOL/L (ref 97–108)
CO2 SERPL-SCNC: 20 MMOL/L (ref 21–32)
CREAT SERPL-MCNC: 0.66 MG/DL (ref 0.55–1.02)
DIAGNOSIS, 93000: NORMAL
DIFFERENTIAL METHOD BLD: ABNORMAL
EOSINOPHIL # BLD: 0.1 K/UL (ref 0–0.4)
EOSINOPHIL NFR BLD: 1 % (ref 0–7)
ERYTHROCYTE [DISTWIDTH] IN BLOOD BY AUTOMATED COUNT: 22.2 % (ref 11.5–14.5)
FOLATE SERPL-MCNC: 8.4 NG/ML (ref 5–21)
GLOBULIN SER CALC-MCNC: 3.8 G/DL (ref 2–4)
GLUCOSE SERPL-MCNC: 162 MG/DL (ref 65–100)
HCT VFR BLD AUTO: 14.2 % (ref 35–47)
HEMOCCULT STL QL: POSITIVE
HGB BLD-MCNC: 3.7 G/DL (ref 11.5–16)
HISTORY CHECKED?,CKHIST: NORMAL
IMM GRANULOCYTES # BLD AUTO: 0 K/UL (ref 0–0.04)
IMM GRANULOCYTES NFR BLD AUTO: 0 % (ref 0–0.5)
LACTATE SERPL-SCNC: 2.1 MMOL/L (ref 0.4–2)
LYMPHOCYTES # BLD: 1.5 K/UL (ref 0.8–3.5)
LYMPHOCYTES NFR BLD: 17 % (ref 12–49)
MCH RBC QN AUTO: 16.2 PG (ref 26–34)
MCHC RBC AUTO-ENTMCNC: 26.1 G/DL (ref 30–36.5)
MCV RBC AUTO: 62 FL (ref 80–99)
MONOCYTES # BLD: 0.3 K/UL (ref 0–1)
MONOCYTES NFR BLD: 3 % (ref 5–13)
NEUTS SEG # BLD: 7.1 K/UL (ref 1.8–8)
NEUTS SEG NFR BLD: 79 % (ref 32–75)
NRBC # BLD: 0.06 K/UL (ref 0–0.01)
NRBC BLD-RTO: 0.7 PER 100 WBC
P-R INTERVAL, ECG05: 120 MS
PATH REV BLD -IMP: NORMAL
PLATELET # BLD AUTO: 309 K/UL (ref 150–400)
PMV BLD AUTO: 10.8 FL (ref 8.9–12.9)
POTASSIUM SERPL-SCNC: 3.5 MMOL/L (ref 3.5–5.1)
PROT SERPL-MCNC: 6.9 G/DL (ref 6.4–8.2)
Q-T INTERVAL, ECG07: 362 MS
QRS DURATION, ECG06: 86 MS
QTC CALCULATION (BEZET), ECG08: 474 MS
RBC # BLD AUTO: 2.29 M/UL (ref 3.8–5.2)
RBC MORPH BLD: ABNORMAL
SODIUM SERPL-SCNC: 137 MMOL/L (ref 136–145)
TROPONIN-HIGH SENSITIVITY: 4 NG/L (ref 0–51)
VENTRICULAR RATE, ECG03: 103 BPM
VIT B12 SERPL-MCNC: 133 PG/ML (ref 193–986)
WBC # BLD AUTO: 9 K/UL (ref 3.6–11)

## 2021-11-01 PROCEDURE — 80053 COMPREHEN METABOLIC PANEL: CPT

## 2021-11-01 PROCEDURE — 83605 ASSAY OF LACTIC ACID: CPT

## 2021-11-01 PROCEDURE — 74011000636 HC RX REV CODE- 636: Performed by: INTERNAL MEDICINE

## 2021-11-01 PROCEDURE — 65660000000 HC RM CCU STEPDOWN

## 2021-11-01 PROCEDURE — 82607 VITAMIN B-12: CPT

## 2021-11-01 PROCEDURE — 84484 ASSAY OF TROPONIN QUANT: CPT

## 2021-11-01 PROCEDURE — 82272 OCCULT BLD FECES 1-3 TESTS: CPT

## 2021-11-01 PROCEDURE — P9016 RBC LEUKOCYTES REDUCED: HCPCS

## 2021-11-01 PROCEDURE — 71045 X-RAY EXAM CHEST 1 VIEW: CPT

## 2021-11-01 PROCEDURE — 82803 BLOOD GASES ANY COMBINATION: CPT

## 2021-11-01 PROCEDURE — 74011250636 HC RX REV CODE- 250/636: Performed by: INTERNAL MEDICINE

## 2021-11-01 PROCEDURE — 85025 COMPLETE CBC W/AUTO DIFF WBC: CPT

## 2021-11-01 PROCEDURE — 82746 ASSAY OF FOLIC ACID SERUM: CPT

## 2021-11-01 PROCEDURE — 93005 ELECTROCARDIOGRAM TRACING: CPT

## 2021-11-01 PROCEDURE — 99285 EMERGENCY DEPT VISIT HI MDM: CPT

## 2021-11-01 PROCEDURE — 36415 COLL VENOUS BLD VENIPUNCTURE: CPT

## 2021-11-01 PROCEDURE — 74011250637 HC RX REV CODE- 250/637: Performed by: INTERNAL MEDICINE

## 2021-11-01 PROCEDURE — 36430 TRANSFUSION BLD/BLD COMPNT: CPT

## 2021-11-01 PROCEDURE — 74178 CT ABD&PLV WO CNTR FLWD CNTR: CPT

## 2021-11-01 PROCEDURE — 86901 BLOOD TYPING SEROLOGIC RH(D): CPT

## 2021-11-01 PROCEDURE — 86923 COMPATIBILITY TEST ELECTRIC: CPT

## 2021-11-01 RX ORDER — ONDANSETRON 2 MG/ML
4 INJECTION INTRAMUSCULAR; INTRAVENOUS
Status: DISCONTINUED | OUTPATIENT
Start: 2021-11-01 | End: 2021-11-04 | Stop reason: HOSPADM

## 2021-11-01 RX ORDER — IPRATROPIUM BROMIDE AND ALBUTEROL SULFATE 2.5; .5 MG/3ML; MG/3ML
3 SOLUTION RESPIRATORY (INHALATION)
Status: DISCONTINUED | OUTPATIENT
Start: 2021-11-01 | End: 2021-11-04 | Stop reason: HOSPADM

## 2021-11-01 RX ORDER — SODIUM CHLORIDE 9 MG/ML
125 INJECTION, SOLUTION INTRAVENOUS CONTINUOUS
Status: DISCONTINUED | OUTPATIENT
Start: 2021-11-01 | End: 2021-11-03

## 2021-11-01 RX ORDER — ACETAMINOPHEN 650 MG/1
650 SUPPOSITORY RECTAL
Status: DISCONTINUED | OUTPATIENT
Start: 2021-11-01 | End: 2021-11-04 | Stop reason: HOSPADM

## 2021-11-01 RX ORDER — SODIUM CHLORIDE 0.9 % (FLUSH) 0.9 %
5-40 SYRINGE (ML) INJECTION EVERY 8 HOURS
Status: DISCONTINUED | OUTPATIENT
Start: 2021-11-01 | End: 2021-11-04 | Stop reason: SDUPTHER

## 2021-11-01 RX ORDER — SODIUM CHLORIDE 0.9 % (FLUSH) 0.9 %
5-40 SYRINGE (ML) INJECTION AS NEEDED
Status: DISCONTINUED | OUTPATIENT
Start: 2021-11-01 | End: 2021-11-04 | Stop reason: SDUPTHER

## 2021-11-01 RX ORDER — SODIUM CHLORIDE 9 MG/ML
250 INJECTION, SOLUTION INTRAVENOUS AS NEEDED
Status: DISPENSED | OUTPATIENT
Start: 2021-11-01 | End: 2021-11-02

## 2021-11-01 RX ORDER — POLYETHYLENE GLYCOL 3350 17 G/17G
17 POWDER, FOR SOLUTION ORAL DAILY PRN
Status: DISCONTINUED | OUTPATIENT
Start: 2021-11-01 | End: 2021-11-04 | Stop reason: HOSPADM

## 2021-11-01 RX ORDER — ACETAMINOPHEN 325 MG/1
650 TABLET ORAL
Status: DISCONTINUED | OUTPATIENT
Start: 2021-11-01 | End: 2021-11-04 | Stop reason: HOSPADM

## 2021-11-01 RX ORDER — ONDANSETRON 4 MG/1
4 TABLET, ORALLY DISINTEGRATING ORAL
Status: DISCONTINUED | OUTPATIENT
Start: 2021-11-01 | End: 2021-11-04 | Stop reason: HOSPADM

## 2021-11-01 RX ADMIN — SODIUM CHLORIDE 10 ML: 9 INJECTION, SOLUTION INTRAMUSCULAR; INTRAVENOUS; SUBCUTANEOUS at 17:55

## 2021-11-01 RX ADMIN — ACETAMINOPHEN 650 MG: 325 TABLET ORAL at 20:43

## 2021-11-01 RX ADMIN — IOPAMIDOL 100 ML: 755 INJECTION, SOLUTION INTRAVENOUS at 17:29

## 2021-11-01 RX ADMIN — SODIUM CHLORIDE 125 ML/HR: 9 INJECTION, SOLUTION INTRAVENOUS at 17:54

## 2021-11-01 NOTE — H&P
Hospitalist Admission Note    NAME: Estrada Ledbetter   :  1970   MRN:  [id number]     Date/Time:  2021 4:06 PM    Patient PCP: None  ______________________________________________________________________  Given the patient's current clinical presentation, I have a high level of concern for decompensation if discharged from the emergency department. Complex decision making was performed, which includes reviewing the patient's available past medical records, laboratory results, and x-ray films. My assessment of this patient's clinical condition and my plan of care is as follows. Assessment / Plan:  Severe new onset anemia  -Presented to urgent care with fatigue and shortness of breath, found to have hemoglobin of 3.8, referred to ED reviewed hemoglobin is 3.7  -Transfuse 3 units RBCs  -Patient reports brown formed stool, FOBT is positive  -Never had colonoscopy  -Routine GI consult for colonoscopy  -Patient is hemodynamically stable  -Last hemoglobin on record in [facility] 14.4 2015  -Patient denies melena, or gross blood per rectum  -Some gnawing abdominal pain, check CT A/P  -IVF  -Trend H/H  -Denies unintentional weight changes  -Anemia is microcytic  -Check iron panel and ferritin    Tobacco use  -Quit smoking 2004 restarted about 2 years ago and smokes 1-1.5 packs/day per  the patient states she does not smoke that much  -Having coughing fits in the ED  -Clear lungs, normal chest x-ray, normal O2 sats  -Given cessation counseling  -Declines nicotine replacement for now    History of migraines  -Takes amitriptyline and topiramate    Seasonal allergies  -Zyrtec    Code Status: Full  Surrogate Decision Maker:     DVT Prophylaxis: SCDs  GI Prophylaxis: not indicated    Baseline: Active, independent ADLs, lives at home with family.   Patient and her  were this [place]      Subjective:   CHIEF COMPLAINT: Fatigue, shortness of breath    HISTORY OF PRESENT ILLNESS:     Paras Garsia is a 46 y.o.  female with past medical history of tobacco use, migraines, and seasonal allergies who presented earlier today to urgent care for fatigue and shortness of breath and was found to have a hemoglobin of 3.8. She was subsequently referred to the ED here at [facility] and repeat lab work confirmed hemoglobin of 3.7. Last blood work of record here indicates hemoglobin greater than 14 back in 2015 and patient does not follow with doctors regularly. Patient and her  were smoking mountains this past week hiking, including some hunting mountains up to almost 5000 feet elevation. Patient has been feeling weak for several weeks. She denies unintentional weight loss, gross blood per stool, or stool color changes. She is not on blood thinners. She describes feeling some general malaise and intermittently some nausea abdominal pain. According to her , he encouraged her to seek medical care but patient did not want to leave for vacation early. She denies any chest pain, or any fevers or chills. We were asked to admit for work up and evaluation of the above problems.      Past Medical History:   Diagnosis Date    Anemia NEC     Aneurysm     Delivery normal     x3    Dermatitis     Headache     Headache(784.0)     HX OTHER MEDICAL     Stroke ([facility] [address]     brain anuerysm, 1998    Vertigo         Past Surgical History:   Procedure Laterality Date    HX GYN      cervical biopsy    HX HYSTERECTOMY      HX ORTHOPAEDIC      foot surgery    HI ANEURYSM, INTRACRAN, SIMPLE SURG         Social History     Tobacco Use    Smoking status: 1.0-1.5PPD     Packs/day: 1.00     Years: 5.00     Pack years: 5.00     Quit date: 3/9/2004, restarted circa 2019     :     Smokeless tobacco: Never Used   Substance Use Topics    Alcohol use: Yes     Comment: rarely        Family History   Problem Relation Age of Onset    Diabetes Mother     Hypertension Mother     Heart Disease Maternal Aunt      Allergies   Allergen Reactions    Aspirin Unknown (comments)     Avoids due to brain aneurysm        Prior to Admission medications    Medication Sig Start Date End Date Taking? Authorizing Provider   butalbital-acetaminophen-caffeine (FIORICET, ESGIC) -40 mg per tablet TAKE 1 TABLET BY MOUTH EVERY 6 HOURS AS NEEDED FOR HEADACHE 4/1/21   Brett Lorenz NP   amitriptyline (ELAVIL) 25 mg tablet Take 1 Tab by mouth nightly. 1/28/21   Brett Lorenz NP   topiramate (TOPAMAX) 50 mg tablet Take 1 Tab by mouth two (2) times a day. Indications: migraine prevention 1/28/21   Brett Lorenz NP   loratadine (CLARITIN) 10 mg tablet Take 10 mg by mouth. Provider, Historical   HYDROcodone-acetaminophen (NORCO) 5-325 mg per tablet Take  by mouth. Provider, Historical   cetirizine (ZYRTEC) 10 mg tablet Take 10 mg by mouth daily. Indications: SEASONAL ALLERGIC RHINITIS    Other, MD [place]ma   acetaminophen (TYLENOL EXTRA STRENGTH) 500 mg tablet Take  by mouth every six (6) hours as needed for Pain. Provider, Historical       REVIEW OF SYSTEMS:     I am not able to complete the review of systems because:    The patient is intubated and sedated    The patient has altered mental status due to his acute medical problems    The patient has baseline aphasia from prior stroke(s)    The patient has baseline dementia and is not reliable historian    The patient is in acute medical distress and unable to provide information           Total of 12 systems reviewed as follows:       POSITIVE= underlined text  Negative = text not underlined  General:  fever, chills, sweats, generalized weakness, weight loss/gain,      loss of appetite   Eyes:    blurred vision, eye pain, loss of vision, double vision  ENT:    rhinorrhea, pharyngitis   Respiratory:   cough, sputum production, SOB, BOWENS, wheezing, pleuritic pain   Cardiology:   chest pain, palpitations, orthopnea, PND, edema, syncope   Gastrointestinal:  abdominal pain , N/V, diarrhea, dysphagia, constipation, bleeding   Genitourinary:  frequency, urgency, dysuria, hematuria, incontinence   Muskuloskeletal :  arthralgia, myalgia, back pain  Hematology:  easy bruising, nose or gum bleeding, lymphadenopathy   Dermatological: rash, ulceration, pruritis, color change / jaundice  Endocrine:   hot flashes or polydipsia   Neurological:  headache, dizziness, confusion, focal weakness, paresthesia,     Speech difficulties, memory loss, gait difficulty  Psychological: Feelings of anxiety, depression, agitation    Objective:   VITALS:    Visit Vitals  /60   Pulse (!) 105   Temp 98.8 °F (37.1 °C)   Resp 18   Ht 5' 8\" (1.727 m)   Wt 91.6 kg (201 lb 15.1 oz)   SpO2 100%   BMI 30.71 kg/m²       PHYSICAL EXAM:    General:    Alert, cooperative, no distress, appears stated age. HEENT: Atraumatic, anicteric sclerae, pink conjunctivae     No oral ulcers, mucosa moist, throat clear, dentition fair  Neck:  Supple, symmetrical,  thyroid: non tender  Lungs:   Clear to auscultation bilaterally. No Wheezing or Rhonchi. No rales. Chest wall:  No tenderness  No Accessory muscle use. Heart:   Regular  rhythm,  No  murmur   No edema  Abdomen:   Soft, non-tender. Not distended. Bowel sounds normal  Extremities: No cyanosis. No clubbing,      Skin turgor normal, Capillary refill normal, Radial dial pulse 2+  Skin:     Not pale. Not Jaundiced  No rashes   Psych:  Good insight. Not depressed. Not anxious or agitated. Neurologic: EOMs intact. No facial asymmetry. No aphasia or slurred speech. Symmetrical strength, Sensation grossly intact.  Alert and oriented X 4.     _______________________________________________________________________  Care Plan discussed with:    Comments   Patient x    Family  x    RN x    Care Manager                    Consultant:  fiordaliza JONES MD _______________________________________________________________________  Expected  Disposition:   Home with Family x   HH/PT/OT/RN    SNF/LTC    ENMANUEL    ________________________________________________________________________  TOTAL TIME:  72 Minutes    Critical Care Provided     Minutes non procedure based      Comments    x Reviewed previous records   >50% of visit spent in counseling and coordination of care x Discussion with patient and/or family and questions answered       ________________________________________________________________________  Signed: Douglas Brink,     Procedures: see electronic medical records for all procedures/Xrays and details which were not copied into this note but were reviewed prior to creation of Plan. LAB DATA REVIEWED:    Recent Results (from the past 24 hour(s))   RBC, ALLOCATE    Collection Time: 11/01/21 12:45 PM   Result Value Ref Range    HISTORY CHECKED? Historical check performed    CBC WITH AUTOMATED DIFF    Collection Time: 11/01/21  1:03 PM   Result Value Ref Range    WBC 9.0 3.6 - 11.0 K/uL    RBC 2.29 (L) 3.80 - 5.20 M/uL    HGB 3.7 (LL) 11.5 - 16.0 g/dL    HCT 14.2 (LL) 35.0 - 47.0 %    MCV 62.0 (L) 80.0 - 99.0 FL    MCH 16.2 (L) 26.0 - 34.0 PG    MCHC 26.1 (L) 30.0 - 36.5 g/dL    RDW 22.2 (H) 11.5 - 14.5 %    PLATELET 394 438 - 925 K/uL    MPV 10.8 8.9 - 12.9 FL    NRBC 0.7 (H) 0  WBC    ABSOLUTE NRBC 0.06 (H) 0.00 - 0.01 K/uL    NEUTROPHILS 79 (H) 32 - 75 %    LYMPHOCYTES 17 12 - 49 %    MONOCYTES 3 (L) 5 - 13 %    EOSINOPHILS 1 0 - 7 %    BASOPHILS 0 0 - 1 %    IMMATURE GRANULOCYTES 0 0.0 - 0.5 %    ABS. NEUTROPHILS 7.1 1.8 - 8.0 K/UL    ABS. LYMPHOCYTES 1.5 0.8 - 3.5 K/UL    ABS. MONOCYTES 0.3 0.0 - 1.0 K/UL    ABS. EOSINOPHILS 0.1 0.0 - 0.4 K/UL    ABS. BASOPHILS 0.0 0.0 - 0.1 K/UL    ABS. IMM.  GRANS. 0.0 0.00 - 0.04 K/UL    DF MANUAL      RBC COMMENTS ANISOCYTOSIS  2+        RBC COMMENTS HYPOCHROMIA  4+        RBC COMMENTS POLYCHROMASIA  1+       METABOLIC PANEL, COMPREHENSIVE    Collection Time: 11/01/21  1:03 PM   Result Value Ref Range    Sodium 137 136 - 145 mmol/L    Potassium 3.5 3.5 - 5.1 mmol/L    Chloride 108 97 - 108 mmol/L    CO2 20 (L) 21 - 32 mmol/L    Anion gap 9 5 - 15 mmol/L    Glucose 162 (H) 65 - 100 mg/dL    BUN 9 6 - 20 MG/DL    Creatinine 0.66 0.55 - 1.02 MG/DL    BUN/Creatinine ratio 14 12 - 20      GFR est AA >60 >60 ml/min/1.73m2    GFR est non-AA >60 >60 ml/min/1.73m2    Calcium 8.5 8.5 - 10.1 MG/DL    Bilirubin, total 0.3 0.2 - 1.0 MG/DL    ALT (SGPT) 18 12 - 78 U/L    AST (SGOT) 12 (L) 15 - 37 U/L    Alk. phosphatase 122 (H) 45 - 117 U/L    Protein, total 6.9 6.4 - 8.2 g/dL    Albumin 3.1 (L) 3.5 - 5.0 g/dL    Globulin 3.8 2.0 - 4.0 g/dL    A-G Ratio 0.8 (L) 1.1 - 2.2     LACTIC ACID    Collection Time: 11/01/21  1:03 PM   Result Value Ref Range    Lactic acid 2.1 (HH) 0.4 - 2.0 MMOL/L   TYPE & SCREEN    Collection Time: 11/01/21  1:03 PM   Result Value Ref Range    Crossmatch Expiration 11/04/2021,2359     ABO/Rh(D) A NEGATIVE     Antibody screen NEG     Unit number [id number]     Blood component type RC LR     Unit division 00     Status of unit ISSUED     Crossmatch result Compatible     Unit number [id number]     Blood component type  LR,2     Unit division 00     Status of unit ALLOCATED     Crossmatch result Compatible    TROPONIN-HIGH SENSITIVITY    Collection Time: 11/01/21  1:03 PM   Result Value Ref Range    Troponin-High Sensitivity 4 0 - 51 ng/L   OCCULT BLOOD, STOOL    Collection Time: 11/01/21  1:03 PM   Result Value Ref Range    Occult blood, stool Positive (A) NEG     PATHOLOGIST REVIEW    Collection Time: 11/01/21  1:03 PM   Result Value Ref Range    Pathologist review        Pathologic examination results can be viewed in [facility] Chart Review under the Pathology tab.    EKG, 12 LEAD, INITIAL    Collection Time: 11/01/21  1:50 PM   Result Value Ref Range    Ventricular Rate 103 BPM    Atrial Rate 103 BPM    P-R Interval 120 ms    QRS Duration 86 ms    Q-T Interval 362 ms    QTC Calculation (Bezet) 474 ms    Calculated P Axis 38 degrees    Calculated R Axis 26 degrees    Calculated T Axis 160 degrees    Diagnosis       Sinus tachycardia  ST & T wave abnormality, consider lateral ischemia  When compared with ECG of 21-SEP-2015 23:50,  ST now depressed in Anterior leads  Nonspecific T wave abnormality now evident in Inferior leads  T wave inversion now evident in Anterolateral leads

## 2021-11-01 NOTE — TELEPHONE ENCOUNTER
Received call from [facility] with Red Flag Complaint. Brief description of triage: low blood pressure 87/53 this morning, weakness, if she gets up and walks her thighs get tired, shoulders get tired when she washes her hair, gets lightheaded at times, some indigestion. Has been going on for a few months. Triage indicates for patient to Go to the ED now (or to office with PCP approval)    Second level triage, calling [facility], spoke with Jose Roberto Hinton who states patient has not been seen since 2015, not an established patient any longer. Patient agrees to go to [facility] in her area. Care advice provided, patient verbalizes understanding; denies any other questions or concerns; instructed to call back for any new or worsening symptoms. Attention Provider: Thank you for allowing me to participate in the care of your patient. The patient was connected to triage in response to information provided to the ECC. Please do not respond through this encounter as the response is not directed to a shared pool. Reason for Disposition   Fall in systolic BP > 20 mm Hg from normal and feeling dizzy, lightheaded, or weak    Answer Assessment - Initial Assessment Questions  1. BLOOD PRESSURE: \"What is the blood pressure? \" \"Did you take at least two measurements 5 minutes apart? \"      87/53    2. ONSET: \"When did you take your blood pressure? \"      This morning, was about the same yesterday     3. HOW: \"How did you obtain the blood pressure? \" (e.g., visiting nurse, automatic home BP monitor)      Home cuff     4. HISTORY: \"Do you have a history of low blood pressure? \" \"What is your blood pressure normally? \"      Denies    5. MEDICATIONS: Pattie Juan A you taking any medications for blood pressure? \" If yes: \"Have they been changed recently? \"      Denies     6. PULSE RATE: \"Do you know what your pulse rate is?\"       123    7. OTHER SYMPTOMS: Paulina King you been sick recently? \" \"Have you had a recent injury? \"      Feels sob, mouth dry     8. PREGNANCY: \"Is there any chance you are pregnant? \" \"When was your last menstrual period? \"      N/a    Protocols used: LOW BLOOD PRESSURE-ADULT-[place]

## 2021-11-01 NOTE — PROGRESS NOTES
Bedside shift change report given to nelson (oncoming nurse) by ramon (offgoing nurse). Report included the following information SBAR, Kardex, ED Summary, Intake/Output, MAR and Recent Results.

## 2021-11-01 NOTE — ED PROVIDER NOTES
EMERGENCY DEPARTMENT HISTORY AND PHYSICAL EXAM      Date: 11/1/2021  Patient Name: Agustina An  Patient Age and Sex: 46 y.o. female     History of Presenting Illness     Chief Complaint   Patient presents with    Fatigue     staes that she is weak and has low blood pressure       History Provided By: Patient    HPI: Agustina An is a 72-year-old history migraines on Topamax, amitriptyline presenting with fatigue. She reports months of fatigue, dyspnea on exertion. She checked her blood pressure at home and found to be 89 systolic that [facility]  [facility] demonstrated anemia with hemoglobin 3.8, acidosis with bicarb 17. She is transferred for further evaluation. She denies any recent weight loss, hematochezia, hematemesis, melena. She is status post hysterectomy, no hematuria. She denies any chest pain abdominal pain. She does have a history of tobacco abuse. She is no dietary restrictions, anemia was macrocytic on lab work performed at [facility].    There are no other complaints, changes, or physical findings at this time. PCP: None    No current facility-administered medications on file prior to encounter. Current Outpatient Medications on File Prior to Encounter   Medication Sig Dispense Refill    butalbital-acetaminophen-caffeine (FIORICET, ESGIC) -40 mg per tablet TAKE 1 TABLET BY MOUTH EVERY 6 HOURS AS NEEDED FOR HEADACHE 20 Tab 1    amitriptyline (ELAVIL) 25 mg tablet Take 1 Tab by mouth nightly. 90 Tab 3    topiramate (TOPAMAX) 50 mg tablet Take 1 Tab by mouth two (2) times a day. Indications: migraine prevention 180 Tab 3    loratadine (CLARITIN) 10 mg tablet Take 10 mg by mouth.  HYDROcodone-acetaminophen (NORCO) 5-325 mg per tablet Take  by mouth.  cetirizine (ZYRTEC) 10 mg tablet Take 10 mg by mouth daily.  Indications: SEASONAL ALLERGIC RHINITIS      acetaminophen (TYLENOL EXTRA STRENGTH) 500 mg tablet Take  by mouth every six (6) hours as needed for Pain. Past History     Past Medical History:  Past Medical History:   Diagnosis Date    Anemia NEC     Aneurysm     Delivery normal     x3    Dermatitis     Headache     Headache(784.0)     HX OTHER MEDICAL     Stroke ([facility] [address]     brain anuerysm,     Vertigo        Past Surgical History:  Past Surgical History:   Procedure Laterality Date    HX GYN      cervical biopsy    HX HYSTERECTOMY      HX ORTHOPAEDIC      foot surgery    SC ANEURYSM, INTRACRAN, SIMPLE SURG         Family History:  Family History   Problem Relation Age of Onset    Diabetes Mother     Hypertension Mother     Heart Disease Maternal Aunt        Social History:  Social History     Tobacco Use    Smoking status: Former Smoker     Packs/day: 1.00     Years: 5.00     Pack years: 5.00     Quit date: 3/9/2004     Years since quittin.6    Smokeless tobacco: Never Used   Substance Use Topics    Alcohol use: Yes     Comment: rarely    Drug use: No       Allergies: Allergies   Allergen Reactions    Aspirin Unknown (comments)     Avoids due to brain aneurysm         Review of Systems   Review of Systems   Constitutional: Positive for fatigue. Respiratory: Positive for shortness of breath. Cardiovascular: Positive for chest pain. All other systems reviewed and are negative. Physical Exam   Physical Exam   General: Alert, no acute distress  Skin: Warm, dry, pale  Head: Normocephalic, atraumatic  Eye: EOMI, conjunctival pallor  Ears, Nose, Mouth and Throat: Oral mucosa moist, no pharyngeal erythema or exudate  Cardiovascular: No murmur, normal peripheral perfusion, regular rhythm  Pulmonary: Normal respiratory effort, normal breath sounds  Gastrointestinal: Soft, nontender, nondistended  Musculoskeletal: No swelling, no deformity  Neurological: Alert and oriented to person, place, situation. Normal speech observed. Moving all extremities symmetrically.   Psychiatric: Cooperative, appropriate affect  Rectal: Brown stool Hemoccult positive    Diagnostic Study Results     Labs      Recent Results (from the past 12 hour(s))   RBC, ALLOCATE    Collection Time: 11/01/21 12:45 PM   Result Value Ref Range    HISTORY CHECKED? Historical check performed    CBC WITH AUTOMATED DIFF    Collection Time: 11/01/21  1:03 PM   Result Value Ref Range    WBC 9.0 3.6 - 11.0 K/uL    RBC 2.29 (L) 3.80 - 5.20 M/uL    HGB 3.7 (LL) 11.5 - 16.0 g/dL    HCT 14.2 (LL) 35.0 - 47.0 %    MCV 62.0 (L) 80.0 - 99.0 FL    MCH 16.2 (L) 26.0 - 34.0 PG    MCHC 26.1 (L) 30.0 - 36.5 g/dL    RDW 22.2 (H) 11.5 - 14.5 %    PLATELET 876 889 - 539 K/uL    MPV 10.8 8.9 - 12.9 FL    NRBC 0.7 (H) 0  WBC    ABSOLUTE NRBC 0.06 (H) 0.00 - 0.01 K/uL    NEUTROPHILS 79 (H) 32 - 75 %    LYMPHOCYTES 17 12 - 49 %    MONOCYTES 3 (L) 5 - 13 %    EOSINOPHILS 1 0 - 7 %    BASOPHILS 0 0 - 1 %    IMMATURE GRANULOCYTES 0 0.0 - 0.5 %    ABS. NEUTROPHILS 7.1 1.8 - 8.0 K/UL    ABS. LYMPHOCYTES 1.5 0.8 - 3.5 K/UL    ABS. MONOCYTES 0.3 0.0 - 1.0 K/UL    ABS. EOSINOPHILS 0.1 0.0 - 0.4 K/UL    ABS. BASOPHILS 0.0 0.0 - 0.1 K/UL    ABS. IMM. GRANS. 0.0 0.00 - 0.04 K/UL    DF MANUAL      RBC COMMENTS ANISOCYTOSIS  2+        RBC COMMENTS HYPOCHROMIA  4+        RBC COMMENTS POLYCHROMASIA  1+       METABOLIC PANEL, COMPREHENSIVE    Collection Time: 11/01/21  1:03 PM   Result Value Ref Range    Sodium 137 136 - 145 mmol/L    Potassium 3.5 3.5 - 5.1 mmol/L    Chloride 108 97 - 108 mmol/L    CO2 20 (L) 21 - 32 mmol/L    Anion gap 9 5 - 15 mmol/L    Glucose 162 (H) 65 - 100 mg/dL    BUN 9 6 - 20 MG/DL    Creatinine 0.66 0.55 - 1.02 MG/DL    BUN/Creatinine ratio 14 12 - 20      GFR est AA >60 >60 ml/min/1.73m2    GFR est non-AA >60 >60 ml/min/1.73m2    Calcium 8.5 8.5 - 10.1 MG/DL    Bilirubin, total 0.3 0.2 - 1.0 MG/DL    ALT (SGPT) 18 12 - 78 U/L    AST (SGOT) 12 (L) 15 - 37 U/L    Alk.  phosphatase 122 (H) 45 - 117 U/L    Protein, total 6.9 6.4 - 8.2 g/dL Albumin 3.1 (L) 3.5 - 5.0 g/dL    Globulin 3.8 2.0 - 4.0 g/dL    A-G Ratio 0.8 (L) 1.1 - 2.2     LACTIC ACID    Collection Time: 11/01/21  1:03 PM   Result Value Ref Range    Lactic acid 2.1 (HH) 0.4 - 2.0 MMOL/L   TYPE & SCREEN    Collection Time: 11/01/21  1:03 PM   Result Value Ref Range    Crossmatch Expiration 11/04/2021,2359     ABO/Rh(D) A NEGATIVE     Antibody screen NEG     Unit number [id number]     Blood component type RC LR     Unit division 00     Status of unit ISSUED     Crossmatch result Compatible     Unit number [id number]     Blood component type RC LR,2     Unit division 00     Status of unit ALLOCATED     Crossmatch result Compatible    TROPONIN-HIGH SENSITIVITY    Collection Time: 11/01/21  1:03 PM   Result Value Ref Range    Troponin-High Sensitivity 4 0 - 51 ng/L   OCCULT BLOOD, STOOL    Collection Time: 11/01/21  1:03 PM   Result Value Ref Range    Occult blood, stool Positive (A) NEG     FOLATE    Collection Time: 11/01/21  1:03 PM   Result Value Ref Range    Folate 8.4 5.0 - 21.0 ng/mL   VITAMIN B12    Collection Time: 11/01/21  1:03 PM   Result Value Ref Range    Vitamin B12 133 (L) 193 - 986 pg/mL   PATHOLOGIST REVIEW    Collection Time: 11/01/21  1:03 PM   Result Value Ref Range    Pathologist review        Pathologic examination results can be viewed in [facility] Chart Review under the Pathology tab.    EKG, 12 LEAD, INITIAL    Collection Time: 11/01/21  1:50 PM   Result Value Ref Range    Ventricular Rate 103 BPM    Atrial Rate 103 BPM    P-R Interval 120 ms    QRS Duration 86 ms    Q-T Interval 362 ms    QTC Calculation (Bezet) 474 ms    Calculated P Axis 38 degrees    Calculated R Axis 26 degrees    Calculated T Axis 160 degrees    Diagnosis       Sinus tachycardia  ST & T wave abnormality, consider lateral ischemia  When compared with ECG of 21-SEP-2015 23:50,  ST now depressed in Anterior leads  Nonspecific T wave abnormality now evident in Inferior leads  T wave inversion now evident in Anterolateral leads         Radiologic Studies -   XR CHEST PORT   Final Result   1. No acute process      CT ABD PELV W WO CONT    (Results Pending)     CT Results  (Last 48 hours)    None        CXR Results  (Last 48 hours)               11/01/21 1259  XR CHEST PORT Final result    Impression:  1. No acute process       Narrative:  EXAM: XR CHEST PORT       INDICATION: dyspnea       COMPARISON: None. FINDINGS: A portable AP radiograph of the chest was obtained at 1253 hours. The   patient is on a cardiac monitor. Heart size is normal. Lungs are clear. There is   a small moderate hiatal hernia. Medical Decision Making   I am the first provider for this patient. I reviewed the vital signs, available nursing notes, past medical history, past surgical history, family history and social history. Vital Signs-Reviewed the patient's vital signs. Patient Vitals for the past 12 hrs:   Temp Pulse Resp BP SpO2   11/01/21 1631 -- (!) 105 20 (!) 122/50 100 %   11/01/21 1616 -- (!) 107 24 -- 100 %   11/01/21 1601 -- (!) 110 14 -- --   11/01/21 1600 -- (!) 105 18 124/60 --   11/01/21 1546 98.8 °F (37.1 °C) (!) 108 18 133/66 100 %   11/01/21 1545 -- (!) 104 20 133/66 100 %   11/01/21 1530 -- -- -- 134/63 --   11/01/21 1527 98 °F (36.7 °C) (!) 112 26 134/63 100 %   11/01/21 1500 -- (!) 107 20 126/84 100 %   11/01/21 1300 -- (!) 105 -- -- --   11/01/21 1244 -- (!) 112 18 (!) 141/54 100 %   11/01/21 1156 98 °F (36.7 °C) (!) 126 18 117/63 100 %       Records Reviewed: Nursing Notes and Old Medical Records    Provider Notes (Medical Decision Making):   Severe anemia on outpatient lab work with significant pallor noted on exam.  Will repeat lab work including CBC, type and crossmatch for 3 units RBCs. No clear source at this time however heme positive stool may represent slow GI bleed. With reported macrocytosis will obtain folate, B12 level to facilitate inpatient management.   Obtain CMP, plan admission. ED Course:   Initial assessment performed. The patients presenting problems have been discussed, and they are in agreement with the care plan formulated and outlined with them. I have encouraged them to ask questions as they arise throughout their visit. ED Course as of Nov 01 1725   Mon Nov 01, 2021   1243 Rectal exam with brown stool    [WB]   1402 Lactic elevated at 2.1, hemoglobin 3.7. White count and platelet are normal    [WB]   1403 Hemoccult is positive    [WB]   1403 EKG obtained demonstrates sinus tachycardia rate of 103 with ST depressions V3 V4 V5 as well as 1 and aVL in the lateral distribution, no ST elevation    [WB]   1403 This is likely related to strain in the setting of her significant anemia    [WB]   1404 Troponin IV    [WB]      ED Course User Index  [WB] Rochelle Lee MD     Disposition:  Admission Note:  Patient is being admitted to the hospital by Dr. Rick Alexander, Service: Hospitalist.  The results of their tests and reasons for their admission have been discussed with them and available family. They convey agreement and understanding for the need to be admitted and for their admission diagnosis. Diagnosis     Clinical Impression:   1. Hypochromic microcytic anemia        Attestations:    Mariana Jernigan M.D. Please note that this dictation was completed with [employer], the computer voice recognition software. Quite often unanticipated grammatical, syntax, homophones, and other interpretive errors are inadvertently transcribed by the computer software. Please disregard these errors. Please excuse any errors that have escaped final proofreading. Thank you.

## 2021-11-01 NOTE — ED NOTES
Patient is being transferred to [facility], Room # 8657. Report given to KEL France on Vanessa Whiteside for routine progression of care. Report consisted of the following information SBAR, ED Summary, Procedure Summary, Intake/Output and MAR. Patient transferred to receiving unit by: RN (RN or tech name). Outstanding consults needed: No     Next labs due: Yes    The following personal items will be sent with the patient during transfer to the floor: All valuables:    Cardiac monitoring ordered: Yes    The following CURRENT information was reported to the receiving RN:    Code status: Full Code at time of transfer    Last set of vital signs:  Vital Signs  Level of Consciousness: Alert (0) (11/01/21 1546)  Temp: 98.8 °F (37.1 °C) (11/01/21 1546)  Temp Source: Oral (11/01/21 1546)  Pulse (Heart Rate): (!) 105 (11/01/21 1600)  Heart Rate Source: Monitor (11/01/21 1546)  Cardiac Rhythm: Sinus Tachy (11/01/21 1527)  Resp Rate: 18 (11/01/21 1600)  BP: 124/60 (11/01/21 1600)  MAP (Monitor): 75 (11/01/21 1600)  MAP (Calculated): 81 (11/01/21 1600)  BP 1 Location: Right upper arm (11/01/21 1546)  BP 1 Method: Automatic (11/01/21 1546)  BP Patient Position: At rest (11/01/21 1244)  MEWS Score: 2 (11/01/21 1546)         Oxygen Therapy  O2 Sat (%): 100 % (11/01/21 1546)  Pulse via Oximetry: 59 beats per minute (11/01/21 1545)  O2 Device: None (Room air) (11/01/21 1546)      Last pain assessment:  Pain 1  Pain Scale 1: Numeric (0 - 10)  Pain Intensity 1: 0      Wounds: No     Urinary catheter: voiding  Is there a evangelista order: No     LDAs:       Peripheral IV 11/01/21 Left Antecubital (Active)         Opportunity for questions and clarification was provided.     Madi Castro RN

## 2021-11-02 LAB
ANION GAP SERPL CALC-SCNC: 6 MMOL/L (ref 5–15)
BASE DEFICIT BLDV-SCNC: 6.4 MMOL/L
BUN SERPL-MCNC: 8 MG/DL (ref 6–20)
BUN/CREAT SERPL: 17 (ref 12–20)
CALCIUM SERPL-MCNC: 8 MG/DL (ref 8.5–10.1)
CHLORIDE SERPL-SCNC: 114 MMOL/L (ref 97–108)
CO2 SERPL-SCNC: 18 MMOL/L (ref 21–32)
COVID-19 RAPID TEST, COVR: NOT DETECTED
CREAT SERPL-MCNC: 0.47 MG/DL (ref 0.55–1.02)
FERRITIN SERPL-MCNC: 2 NG/ML (ref 8–252)
GLUCOSE SERPL-MCNC: 113 MG/DL (ref 65–100)
HCO3 BLDV-SCNC: 18.4 MMOL/L (ref 23–28)
HCT VFR BLD AUTO: 17.2 % (ref 35–47)
HCT VFR BLD AUTO: 21.6 % (ref 35–47)
HGB BLD-MCNC: 5 G/DL (ref 11.5–16)
HGB BLD-MCNC: 6.5 G/DL (ref 11.5–16)
HISTORY CHECKED?,CKHIST: NORMAL
IRON SATN MFR SERPL: 8 % (ref 20–50)
IRON SERPL-MCNC: 32 UG/DL (ref 35–150)
PCO2 BLDV: 31.9 MMHG (ref 41–51)
PH BLDV: 7.37 [PH] (ref 7.32–7.42)
PO2 BLDV: <13 MMHG (ref 25–40)
POTASSIUM SERPL-SCNC: 3.4 MMOL/L (ref 3.5–5.1)
SERVICE CMNT-IMP: ABNORMAL
SODIUM SERPL-SCNC: 138 MMOL/L (ref 136–145)
SOURCE, COVRS: NORMAL
SPECIMEN TYPE: ABNORMAL
TIBC SERPL-MCNC: 424 UG/DL (ref 250–450)

## 2021-11-02 PROCEDURE — 94760 N-INVAS EAR/PLS OXIMETRY 1: CPT

## 2021-11-02 PROCEDURE — 36415 COLL VENOUS BLD VENIPUNCTURE: CPT

## 2021-11-02 PROCEDURE — 74011250636 HC RX REV CODE- 250/636: Performed by: INTERNAL MEDICINE

## 2021-11-02 PROCEDURE — 36430 TRANSFUSION BLD/BLD COMPNT: CPT

## 2021-11-02 PROCEDURE — 83540 ASSAY OF IRON: CPT

## 2021-11-02 PROCEDURE — 74011250636 HC RX REV CODE- 250/636: Performed by: NURSE PRACTITIONER

## 2021-11-02 PROCEDURE — 74011000250 HC RX REV CODE- 250: Performed by: NURSE PRACTITIONER

## 2021-11-02 PROCEDURE — 30233N1 TRANSFUSION OF NONAUTOLOGOUS RED BLOOD CELLS INTO PERIPHERAL VEIN, PERCUTANEOUS APPROACH: ICD-10-PCS | Performed by: GENERAL ACUTE CARE HOSPITAL

## 2021-11-02 PROCEDURE — P9016 RBC LEUKOCYTES REDUCED: HCPCS

## 2021-11-02 PROCEDURE — 65660000000 HC RM CCU STEPDOWN

## 2021-11-02 PROCEDURE — C9113 INJ PANTOPRAZOLE SODIUM, VIA: HCPCS | Performed by: NURSE PRACTITIONER

## 2021-11-02 PROCEDURE — 87635 SARS-COV-2 COVID-19 AMP PRB: CPT

## 2021-11-02 PROCEDURE — 80048 BASIC METABOLIC PNL TOTAL CA: CPT

## 2021-11-02 PROCEDURE — 74011000250 HC RX REV CODE- 250: Performed by: INTERNAL MEDICINE

## 2021-11-02 PROCEDURE — 85014 HEMATOCRIT: CPT

## 2021-11-02 PROCEDURE — 82728 ASSAY OF FERRITIN: CPT

## 2021-11-02 RX ORDER — BUTALBITAL, ACETAMINOPHEN AND CAFFEINE 50; 325; 40 MG/1; MG/1; MG/1
1 TABLET ORAL
Status: DISCONTINUED | OUTPATIENT
Start: 2021-11-02 | End: 2021-11-04 | Stop reason: HOSPADM

## 2021-11-02 RX ORDER — SODIUM CHLORIDE 9 MG/ML
250 INJECTION, SOLUTION INTRAVENOUS AS NEEDED
Status: DISCONTINUED | OUTPATIENT
Start: 2021-11-02 | End: 2021-11-04 | Stop reason: SDUPTHER

## 2021-11-02 RX ADMIN — SODIUM CHLORIDE 125 ML/HR: 9 INJECTION, SOLUTION INTRAVENOUS at 02:18

## 2021-11-02 RX ADMIN — SODIUM CHLORIDE 10 ML: 9 INJECTION, SOLUTION INTRAMUSCULAR; INTRAVENOUS; SUBCUTANEOUS at 06:13

## 2021-11-02 RX ADMIN — SODIUM CHLORIDE 40 MG: 9 INJECTION INTRAMUSCULAR; INTRAVENOUS; SUBCUTANEOUS at 21:06

## 2021-11-02 RX ADMIN — SODIUM CHLORIDE 40 MG: 9 INJECTION INTRAMUSCULAR; INTRAVENOUS; SUBCUTANEOUS at 12:25

## 2021-11-02 RX ADMIN — POLYETHYLENE GLYCOL-3350 AND ELECTROLYTES 4000 ML: 236; 6.74; 5.86; 2.97; 22.74 POWDER, FOR SOLUTION ORAL at 17:03

## 2021-11-02 RX ADMIN — SODIUM CHLORIDE 10 ML: 9 INJECTION, SOLUTION INTRAMUSCULAR; INTRAVENOUS; SUBCUTANEOUS at 21:07

## 2021-11-02 RX ADMIN — SODIUM CHLORIDE 10 ML: 9 INJECTION, SOLUTION INTRAMUSCULAR; INTRAVENOUS; SUBCUTANEOUS at 12:24

## 2021-11-02 NOTE — PROGRESS NOTES
Hospitalist Progress Note    NAME: Jaquan Devi   :  1970   MRN:  [id number]       Assessment / Plan:  Microcytic anemia  Suspect chronic GI blood loss  Iron deficiency anemia    -Hemoglobin 3.7 on admission  -FOBT positive  -CT abdomen suggestive of mild chronic inflammatory colitis  -S/p 3 units of PRBC, recheck hemoglobin pending, transfuse PRBC to keep hemoglobin greater than 7  -Continue serial H&H  -Avoid antiplatelets/anticoagulant  -Appreciate GI help  -Plan for EGD and colonoscopy tomorrow    Tobacco use  -Quit smoking 2004 restarted about 2 years ago and smokes 1-1.5 packs/day per  the patient states she does not smoke that much  -Clear lungs, normal chest x-ray, normal O2 sats  -Given cessation counseling  -Declines nicotine replacement for now     History of migraines  -Takes amitriptyline and topiramate     Seasonal allergies  -Zyrtec     Code Status: Full  Surrogate Decision Maker:      DVT Prophylaxis: SCDs  GI Prophylaxis: not indicated     Anticipated discharge TBD depending on EGD/colonoscopy     Subjective:     Chief Complaint / Reason for Physician Visit  \" Received 3 units of PRBC so far, she feels slightly better\". Discussed with RN events overnight. Review of Systems:  Symptom Y/N Comments  Symptom Y/N Comments   Fever/Chills    Chest Pain     Poor Appetite    Edema     Cough    Abdominal Pain     Sputum    Joint Pain     SOB/BOWENS    Pruritis/Rash     Nausea/vomit    Tolerating PT/OT     Diarrhea    Tolerating Diet     Constipation    Other       Could NOT obtain due to:      Objective:     VITALS:   Last 24hrs VS reviewed since prior progress note.  Most recent are:  Patient Vitals for the past 24 hrs:   Temp Pulse Resp BP SpO2   21 0825 97.7 °F (36.5 °C) 96 16 127/61 99 %   21 0640 98.3 °F (36.8 °C) 97 18 (!) 140/59 99 %   21 0620 97.6 °F (36.4 °C) (!) 105 20 134/66 99 %   21 0605 98.3 °F (36.8 °C) 99 18 (!) 118/54 100 %   21 0409 97.8 °F (36.6 °C) 94 18 (!) 92/55 98 %   11/02/21 0240 98.3 °F (36.8 °C) 98 18 (!) 88/61 99 %   11/02/21 0203 97.8 °F (36.6 °C) 98 18 (!) 103/49 100 %   11/02/21 0138 98.7 °F (37.1 °C) 98 18 121/78 96 %   11/02/21 0118 98 °F (36.7 °C) (!) 107 20 (!) 124/55 100 %   11/02/21 0058 98.2 °F (36.8 °C) 98 18 (!) 112/36 98 %   11/01/21 2356 98.7 °F (37.1 °C) -- 18 (!) 108/52 100 %   11/01/21 2123 98.6 °F (37 °C) (!) 110 20 (!) 112/58 99 %   11/01/21 1826 97.7 °F (36.5 °C) (!) 103 16 136/64 100 %   11/01/21 1755 98.1 °F (36.7 °C) 97 18 (!) 126/57 100 %   11/01/21 1631 -- (!) 105 20 (!) 122/50 100 %   11/01/21 1616 -- (!) 107 24 -- 100 %   11/01/21 1601 -- (!) 110 14 -- --   11/01/21 1600 -- (!) 108 18 124/60 --   11/01/21 1546 98.8 °F (37.1 °C) (!) 108 18 133/66 100 %   11/01/21 1545 -- (!) 104 20 133/66 100 %   11/01/21 1530 -- -- -- 134/63 --   11/01/21 1527 98 °F (36.7 °C) (!) 112 26 134/63 100 %   11/01/21 1500 -- (!) 107 20 126/84 100 %   11/01/21 1300 -- (!) 105 -- -- --   11/01/21 1244 -- (!) 112 18 (!) 141/54 100 %   11/01/21 1156 98 °F (36.7 °C) (!) 126 18 117/63 100 %       Intake/Output Summary (Last 24 hours) at 11/2/2021 1058  Last data filed at 11/2/2021 0355  Gross per 24 hour   Intake 636.7 ml   Output --   Net 636.7 ml        I had a face to face encounter and independently examined this patient on 11/2/2021, as outlined below:  PHYSICAL EXAM:  General: WD, WN. Alert, cooperative, no acute distress    EENT:  EOMI. Anicteric sclerae. MMM  Resp:  CTA bilaterally, no wheezing or rales. No accessory muscle use  CV:  Regular  rhythm,  No edema  GI:  Soft, Non distended, Non tender. +Bowel sounds  Neurologic:  Alert and oriented X 3, normal speech,   Psych:   Good insight. Not anxious nor agitated  Skin:  No rashes.   No jaundice    Reviewed most current lab test results and cultures  YES  Reviewed most current radiology test results   YES  Review and summation of old records today    NO  Reviewed patient's current orders and MAR    YES  PMH/SH reviewed - no change compared to H&P  ________________________________________________________________________  Care Plan discussed with:    Comments   Patient x    Family      RN x    Care Manager     Consultant                        Multidiciplinary team rounds were held today with , nursing, pharmacist and clinical coordinator. Patient's plan of care was discussed; medications were reviewed and discharge planning was addressed. ________________________________________________________________________  Total NON critical care TIME:  24   Minutes    Total CRITICAL CARE TIME Spent:   Minutes non procedure based      Comments   >50% of visit spent in counseling and coordination of care     ________________________________________________________________________  Jillian Sierra MD     Procedures: see electronic medical records for all procedures/Xrays and details which were not copied into this note but were reviewed prior to creation of Plan. LABS:  I reviewed today's most current labs and imaging studies.   Pertinent labs include:  Recent Labs     11/02/21  0252 11/01/21  1303   WBC  --  9.0   HGB 5.0* 3.7*   HCT 17.2* 14.2*   PLT  --  309     Recent Labs     11/02/21  0252 11/01/21  1303    137   K 3.4* 3.5   * 108   CO2 18* 20*   * 162*   BUN 8 9   CREA 0.47* 0.66   CA 8.0* 8.5   ALB  --  3.1*   TBILI  --  0.3   ALT  --  18       Signed: Jillian Sierra MD

## 2021-11-02 NOTE — PROGRESS NOTES
Picked up pt at 1500. Blood products continued into 3-7pm shift. Bowel prep began. Vitals stable. No significant changes. No distress noted. Denies pain. SBAR report given to oncoming night shift RN.

## 2021-11-02 NOTE — PROGRESS NOTES
Informed NP Atoosa of patients recent BP of 88/61, other vs stable, patient asymptomatic and no complaints of any sort. Also getting second unit of blood and normal saline at 125 ml/hr.

## 2021-11-02 NOTE — PROGRESS NOTES
Bedside shift change report given to Jose Roberto Flower RN  (oncoming nurse) by Fayette Kanner , RN (offgoing nurse). Report included the following information SBAR, Kardex, Intake/Output and MAR 
  
Shift worked:  7a-3p Shift summary and any significant changes:  
   completed third unit of blood, GI consult called, NP came to see patient . Pt will be NPO at [facility], GI doc to speak w/pt before consent obtained. Pt to have EGD and colonoscopy tomorrow 
  
   
Concerns for physician to address:    
Zone phone for oncoming shift:   [phone]  
  
Patient Information Anthony Mccurdy 46 y.o. 
11/1/2021 11:55 AM by Buffy Cage DO. Anthoyn Mccurdy was admitted from Home 
  
Problem List 
    
Patient Active Problem List  
  Diagnosis Date Noted  Severe anemia 11/01/2021  
 History of cerebral aneurysm repair 08/04/2014  Pelvic pain in female 08/24/2010  
  
    
Past Medical History:  
Diagnosis Date  Anemia NEC    
 Aneurysm    
 Delivery normal    
  x3  Dermatitis    
 Headache    
 Headache(784.0)    
 HX OTHER MEDICAL    
 Stroke [facility])    
  brain anuerysm, 1998  Vertigo    
  
  
  
  
Activity: 
Activity Level: Up with Assistance 
  
Cardiac:  
Cardiac Monitoring: Yes     
Cardiac Rhythm: Sinus Tachy 
  
Access:  
Current line(s): PIV  
  
Genitourinary:  
Urinary status: voiding 
  
  
Respiratory:  
O2 Device: None (Room air)   
  
  
GI: 
Last Bowel Movement Date: 10/31/21 Current diet:  ADULT DIET Regular 
  
  
Pain Management:  
Patient states pain is manageable on current regimen: YES 
  
Skin: 
Andrés Score: 20 Interventions: increase time out of bed and nutritional support Patient Safety: 
Fall Score: Total Score: 0 Interventions: bed/chair alarm, gripper socks, pt to call before getting OOB and stay with me (per policy) @Rollbelt 
@dexterity to release roll belt  Yes/No ( must document dexterity  here by stating Yes or No here, otherwise this is a restraint and must follow restraint documentation policy.) 
  
DVT prophylaxis: DVT prophylaxis Med- No 
DVT prophylaxis SCD or BRE- No  
  
Wounds: (If Applicable) Wounds- No 
Location  
  
Active Consults: 
IP CONSULT TO HOSPITALIST 
IP CONSULT TO GASTROENTEROLOGY 
  
Length of Stay: 
Expected LOS: - - - Actual LOS: 1 Discharge Plan: Yes home when ready

## 2021-11-02 NOTE — ROUTINE PROCESS
End of Shift Note    Bedside shift change report given to Cali Sol (oncoming nurse) by Stefany Christensen RN (offgoing nurse). Report included the following information SBAR, Kardex, Intake/Output and MAR    Shift worked:  7p-7a   Shift summary and any significant changes:     3rd unit of blood going now       Concerns for physician to address:  Hgb after 2nd unit 5   Zone phone for oncoming shift:   [phone]     Patient Information  Sandra Hernandez  46 y.o.  11/1/2021 11:55 AM by Phil Jonescésar was admitted from Home    Problem List  Patient Active Problem List    Diagnosis Date Noted    Severe anemia 11/01/2021    History of cerebral aneurysm repair 08/04/2014    Pelvic pain in female 08/24/2010     Past Medical History:   Diagnosis Date    Anemia NEC     Aneurysm     Delivery normal     x3    Dermatitis     Headache     Headache(784.0)     HX OTHER MEDICAL     Stroke ([facility] [address]     brain anuerysm, 1998    Vertigo            Activity:  Activity Level: Up with Assistance    Cardiac:   Cardiac Monitoring: Yes      Cardiac Rhythm: Sinus Tachy    Access:   Current line(s): PIV     Genitourinary:   Urinary status: voiding      Respiratory:   O2 Device: None (Room air)           GI:  Last Bowel Movement Date: 10/31/21  Current diet:  ADULT DIET Regular         Pain Management:   Patient states pain is manageable on current regimen: YES    Skin:  Andrés Score: 20  Interventions: increase time out of bed and nutritional support     Patient Safety:  Fall Score:  Total Score: 0  Interventions: bed/chair alarm, gripper socks, pt to call before getting OOB and stay with me (per policy)     @Rollbelt  @dexterity to release roll belt  Yes/No ( must document dexterity  here by stating Yes or No here, otherwise this is a restraint and must follow restraint documentation policy.)    DVT prophylaxis:  DVT prophylaxis Med- No  DVT prophylaxis SCD or BRE- No     Wounds: (If Applicable)  Wounds- No  Location     Active Consults:  IP CONSULT TO HOSPITALIST  IP CONSULT TO GASTROENTEROLOGY    Length of Stay:  Expected LOS: - - -  Actual LOS: 1  Discharge Plan: Yes home when ready      Елена Cohen RN

## 2021-11-02 NOTE — CONSULTS
GI CONSULTATION NOTE  Yg Mccullough NP  [phone] NP in-hospital cell phone M-F until 4:30  After 5pm or on weekends, please call  for physician on call    NAME: Oscar Zamora   :  1970   MRN:  [id number]   Attending:  Dr. Ijeoma Duval  Primary GI:  Dr. Johnny Colon  Date/Time:  2021 10:40 AM  Assessment:   Acute anemia   · Denies melena/hematochezia  · Endorses indigestion/epigastric pain   · Hgb 3.7 in ED, now 5.0 s/p 3 units PRBC  · Iron 32, % saturation 8, ferritin 2  · FOBT +  · CT abd/pel:  No evidence of active extravasation. Minimal scattered colonic diverticula without evidence of diverticulitis. Imaging findings suggestive of a mild chronic inflammatory colitis, consider infectious and inflammatory etiologies    Plan:   · Plan for EGD and colonoscopy tomorrow with Dr. Johnny Colon; obtain consent  · Details and risks of the procedure to include (but not limited to) anesthesia, bleeding, infection, and perforation were discussed. Patient understands and is in agreement with the plan  · NPO after midnight  · CLD today  · Bowel prep to start this afternoon   · Rapid COVID ordered  · Serial H&H; goal for hgb >7.0  · Monitor for s/s of bleeding; transfuse as clinically indicated  · Continue PPI  · Symptomatic care per primary team  Plan discussed with Dr. Johnny Colon     Subjective:     HISTORY OF PRESENT ILLNESS:     Oscar Zamora is an 46 y.o.  female who we are asked to see for complaint of acute anemia. She has a past medical history of tobacco use, migraines, and seasonal allergies who presented to urgent care for fatigue and shortness of breath and was found to have a hemoglobin of 3.8. She was subsequently referred to the ED and repeat lab work confirmed hemoglobin of 3.7. Last blood work of record here indicates hemoglobin greater than 14 back in  and patient does not follow with doctors regularly.   Patient and her  were in the [place] [address] this past week hiking. Patient has been feeling weak for several weeks. She denies unintentional weight loss, gross blood per stool, or stool color changes. She is not on blood thinners. She describes feeling some general malaise and intermittently some nausea abdominal pain. She has never had an EGD/CLN. Past Medical History:   Diagnosis Date    Anemia NEC     Aneurysm     Delivery normal     x3    Dermatitis     Headache     Headache(784.0)     HX OTHER MEDICAL     Stroke ([facility] [address]     brain anuerysm, 1998    Vertigo       Past Surgical History:   Procedure Laterality Date    HX GYN      cervical biopsy    HX HYSTERECTOMY      HX ORTHOPAEDIC      foot surgery    LA ANEURYSM, INTRACRAN, SIMPLE SURG       Social History     Tobacco Use    Smoking status: Former Smoker     Packs/day: 1.00     Years: 5.00     Pack years: 5.00     Quit date: 3/9/2004     Years since quittin.6    Smokeless tobacco: Never Used   Substance Use Topics    Alcohol use: Yes     Comment: rarely      Family History   Problem Relation Age of Onset    Diabetes Mother     Hypertension Mother     Heart Disease Maternal Aunt       Allergies   Allergen Reactions    Aspirin Unknown (comments)     Avoids due to brain aneurysm      Prior to Admission medications    Medication Sig Start Date End Date Taking? Authorizing Provider   butalbital-acetaminophen-caffeine (FIORICET, ESGIC) -40 mg per tablet TAKE 1 TABLET BY MOUTH EVERY 6 HOURS AS NEEDED FOR HEADACHE 21   Phill Pro, NP   amitriptyline (ELAVIL) 25 mg tablet Take 1 Tab by mouth nightly. 21   Phill Pro, NP   topiramate (TOPAMAX) 50 mg tablet Take 1 Tab by mouth two (2) times a day. Indications: migraine prevention 21   Phill Pro, NP   loratadine (CLARITIN) 10 mg tablet Take 10 mg by mouth. Provider, Historical   HYDROcodone-acetaminophen (NORCO) 5-325 mg per tablet Take  by mouth.     Provider, Historical   cetirizine (ZYRTEC) 10 mg tablet Take 10 mg by mouth daily. Indications: SEASONAL ALLERGIC RHINITIS    Other, MD [place]   acetaminophen (TYLENOL EXTRA STRENGTH) 500 mg tablet Take  by mouth every six (6) hours as needed for Pain. Provider, Historical       Patient Active Problem List   Diagnosis Code    Pelvic pain in female R10.2    History of cerebral aneurysm repair Z98.890, Z86.79    Severe anemia D64.9       REVIEW OF SYSTEMS:    Constitutional: negative fever, negative chills, negative weight loss  Eyes:   negative visual changes  ENT:   negative sore throat, tongue or lip swelling   Respiratory:  negative cough, negative dyspnea  Cards:  negative for chest pain, palpitations, lower extremity edema  GI:   See HPI  :  negative for frequency, dysuria  Integument:  negative for rash and pruritus  Heme:  negative for easy bruising and gum/nose bleeding  Musculoskel: negative for myalgias,  back pain and muscle weakness  Neuro: negative for headaches, dizziness, vertigo  Psych: negative for feelings of anxiety, depression     Objective:   VITALS:    Visit Vitals  /61   Pulse 96   Temp 97.7 °F (36.5 °C)   Resp 16   Ht 5' 8\" (1.727 m)   Wt 91.6 kg (201 lb 15.1 oz)   SpO2 99%   BMI 30.71 kg/m²       PHYSICAL EXAM:   General:          Pleasant  female. NAD   Head:               Normocephalic, without obvious abnormality, atraumatic. Eyes:               Conjunctivae clear and pale, anicteric sclerae. Pupils are equal  Nose:               Nares normal. No drainage or sinus tenderness. Throat:             Lips, mucosa, and tongue normal.  No Thrush  Neck:               Supple, symmetrical,  no adenopathy, thyroid: non tender  Back:               Symmetric,  No CVA tenderness. Lungs:             CTA bilaterally. No wheezing/rhonchi/rales. Chest wall:      No tenderness or deformity. No Accessory muscle use. Heart:              Regular rate and rhythm,  no murmur, rub or gallop. Abdomen:        Soft, non-tender. Not distended. Bowel sounds normal. No masses  Extremities:     Atraumatic, No cyanosis. No edema. No clubbing  Skin:                Texture, turgor normal. No rashes/lesions/jaundice  Psych:             Good insight. Not depressed. Not anxious or agitated. Neurologic:      EOMs intact. No facial asymmetry. No aphasia or slurred speech. A/O X 3. LAB DATA REVIEWED:    Recent Results (from the past 24 hour(s))   RBC, ALLOCATE    Collection Time: 11/01/21 12:45 PM   Result Value Ref Range    HISTORY CHECKED? Historical check performed    CBC WITH AUTOMATED DIFF    Collection Time: 11/01/21  1:03 PM   Result Value Ref Range    WBC 9.0 3.6 - 11.0 K/uL    RBC 2.29 (L) 3.80 - 5.20 M/uL    HGB 3.7 (LL) 11.5 - 16.0 g/dL    HCT 14.2 (LL) 35.0 - 47.0 %    MCV 62.0 (L) 80.0 - 99.0 FL    MCH 16.2 (L) 26.0 - 34.0 PG    MCHC 26.1 (L) 30.0 - 36.5 g/dL    RDW 22.2 (H) 11.5 - 14.5 %    PLATELET 537 912 - 437 K/uL    MPV 10.8 8.9 - 12.9 FL    NRBC 0.7 (H) 0  WBC    ABSOLUTE NRBC 0.06 (H) 0.00 - 0.01 K/uL    NEUTROPHILS 79 (H) 32 - 75 %    LYMPHOCYTES 17 12 - 49 %    MONOCYTES 3 (L) 5 - 13 %    EOSINOPHILS 1 0 - 7 %    BASOPHILS 0 0 - 1 %    IMMATURE GRANULOCYTES 0 0.0 - 0.5 %    ABS. NEUTROPHILS 7.1 1.8 - 8.0 K/UL    ABS. LYMPHOCYTES 1.5 0.8 - 3.5 K/UL    ABS. MONOCYTES 0.3 0.0 - 1.0 K/UL    ABS. EOSINOPHILS 0.1 0.0 - 0.4 K/UL    ABS. BASOPHILS 0.0 0.0 - 0.1 K/UL    ABS. IMM.  GRANS. 0.0 0.00 - 0.04 K/UL    DF MANUAL      RBC COMMENTS ANISOCYTOSIS  2+        RBC COMMENTS HYPOCHROMIA  4+        RBC COMMENTS POLYCHROMASIA  1+       METABOLIC PANEL, COMPREHENSIVE    Collection Time: 11/01/21  1:03 PM   Result Value Ref Range    Sodium 137 136 - 145 mmol/L    Potassium 3.5 3.5 - 5.1 mmol/L    Chloride 108 97 - 108 mmol/L    CO2 20 (L) 21 - 32 mmol/L    Anion gap 9 5 - 15 mmol/L    Glucose 162 (H) 65 - 100 mg/dL    BUN 9 6 - 20 MG/DL    Creatinine 0.66 0.55 - 1.02 MG/DL    BUN/Creatinine ratio 14 12 - 20      GFR est AA >60 >60 ml/min/1.73m2    GFR est non-AA >60 >60 ml/min/1.73m2    Calcium 8.5 8.5 - 10.1 MG/DL    Bilirubin, total 0.3 0.2 - 1.0 MG/DL    ALT (SGPT) 18 12 - 78 U/L    AST (SGOT) 12 (L) 15 - 37 U/L    Alk. phosphatase 122 (H) 45 - 117 U/L    Protein, total 6.9 6.4 - 8.2 g/dL    Albumin 3.1 (L) 3.5 - 5.0 g/dL    Globulin 3.8 2.0 - 4.0 g/dL    A-G Ratio 0.8 (L) 1.1 - 2.2     LACTIC ACID    Collection Time: 11/01/21  1:03 PM   Result Value Ref Range    Lactic acid 2.1 (HH) 0.4 - 2.0 MMOL/L   TYPE & SCREEN    Collection Time: 11/01/21  1:03 PM   Result Value Ref Range    Crossmatch Expiration 11/04/2021,2359     ABO/Rh(D) A NEGATIVE     Antibody screen NEG     Unit number [id number]     Blood component type RC LR     Unit division 00     Status of unit TRANSFUSED     Crossmatch result Compatible     Unit number [id number]     Blood component type RC LR,2     Unit division 00     Status of unit ISSUED     Crossmatch result Compatible     Unit number [id number]     Blood component type RC LR,2     Unit division 00     Status of unit ISSUED     Crossmatch result Compatible    TROPONIN-HIGH SENSITIVITY    Collection Time: 11/01/21  1:03 PM   Result Value Ref Range    Troponin-High Sensitivity 4 0 - 51 ng/L   OCCULT BLOOD, STOOL    Collection Time: 11/01/21  1:03 PM   Result Value Ref Range    Occult blood, stool Positive (A) NEG     FOLATE    Collection Time: 11/01/21  1:03 PM   Result Value Ref Range    Folate 8.4 5.0 - 21.0 ng/mL   VITAMIN B12    Collection Time: 11/01/21  1:03 PM   Result Value Ref Range    Vitamin B12 133 (L) 193 - 986 pg/mL   PATHOLOGIST REVIEW    Collection Time: 11/01/21  1:03 PM   Result Value Ref Range    Pathologist review        Pathologic examination results can be viewed in [facility] Care Chart Review under the Pathology tab.    POC VENOUS BLOOD GAS    Collection Time: 11/01/21  1:26 PM   Result Value Ref Range    pH, venous (POC) 7.37 7.32 - 7.42      pCO2, venous (POC) 31.9 (L) 41 - 51 MMHG pO2, venous (POC) <13 (L) 25 - 40 mmHg    HCO3, venous (POC) 18.4 (L) 23.0 - 28.0 MMOL/L    Base deficit, venous (POC) 6.4 mmol/L    Specimen type (POC) VENOUS BLOOD      Performed by Diane CORONADO    EKG, 12 LEAD, INITIAL    Collection Time: 11/01/21  1:50 PM   Result Value Ref Range    Ventricular Rate 103 BPM    Atrial Rate 103 BPM    P-R Interval 120 ms    QRS Duration 86 ms    Q-T Interval 362 ms    QTC Calculation (Bezet) 474 ms    Calculated P Axis 38 degrees    Calculated R Axis 26 degrees    Calculated T Axis 160 degrees    Diagnosis       Sinus tachycardia  Nonspecific ST abnormality    When compared with ECG of 21-SEP-2015 23:50,  ST now depressed in Anterior leads  Nonspecific T wave abnormality now evident in Inferior leads    Confirmed by Rosie Sanders ([id number]) on 11/1/2021 9:46:02 PM     HGB & HCT    Collection Time: 11/02/21  2:52 AM   Result Value Ref Range    HGB 5.0 (LL) 11.5 - 16.0 g/dL    HCT 17.2 (LL) 35.0 - 16.4 %   METABOLIC PANEL, BASIC    Collection Time: 11/02/21  2:52 AM   Result Value Ref Range    Sodium 138 136 - 145 mmol/L    Potassium 3.4 (L) 3.5 - 5.1 mmol/L    Chloride 114 (H) 97 - 108 mmol/L    CO2 18 (L) 21 - 32 mmol/L    Anion gap 6 5 - 15 mmol/L    Glucose 113 (H) 65 - 100 mg/dL    BUN 8 6 - 20 MG/DL    Creatinine 0.47 (L) 0.55 - 1.02 MG/DL    BUN/Creatinine ratio 17 12 - 20      GFR est AA >60 >60 ml/min/1.73m2    GFR est non-AA >60 >60 ml/min/1.73m2    Calcium 8.0 (L) 8.5 - 10.1 MG/DL   IRON PROFILE    Collection Time: 11/02/21  2:52 AM   Result Value Ref Range    Iron 32 (L) 35 - 150 ug/dL    TIBC 424 250 - 450 ug/dL    Iron % saturation 8 (L) 20 - 50 %   FERRITIN    Collection Time: 11/02/21  2:52 AM   Result Value Ref Range    Ferritin 2 (L) 8 - 252 NG/ML       IMAGING RESULTS:  I have personally reviewed the imaging reports      Total time spent with patient: 30 minutes ________________________________________________________________________  Care Plan discussed with:  Patient x   Family     RN x              Consultant:       CT  11/2/2021:  ________________________________________________________________________    ___________________________________________________  Consulting Provider: Kendrick David NP      11/2/2021  10:40 AM

## 2021-11-03 ENCOUNTER — ANESTHESIA (OUTPATIENT)
Dept: ENDOSCOPY | Age: 51
DRG: 812 | End: 2021-11-03
Payer: COMMERCIAL

## 2021-11-03 ENCOUNTER — ANESTHESIA EVENT (OUTPATIENT)
Dept: ENDOSCOPY | Age: 51
DRG: 812 | End: 2021-11-03
Payer: COMMERCIAL

## 2021-11-03 LAB
ABO + RH BLD: NORMAL
ALBUMIN SERPL-MCNC: 3 G/DL (ref 3.5–5)
ALBUMIN/GLOB SERPL: 0.9 {RATIO} (ref 1.1–2.2)
ALP SERPL-CCNC: 121 U/L (ref 45–117)
ALT SERPL-CCNC: 20 U/L (ref 12–78)
ANION GAP SERPL CALC-SCNC: 5 MMOL/L (ref 5–15)
AST SERPL-CCNC: 17 U/L (ref 15–37)
BASOPHILS # BLD: 0.1 K/UL (ref 0–0.1)
BASOPHILS NFR BLD: 1 % (ref 0–1)
BILIRUB SERPL-MCNC: 0.6 MG/DL (ref 0.2–1)
BLD PROD TYP BPU: NORMAL
BLOOD GROUP ANTIBODIES SERPL: NORMAL
BPU ID: NORMAL
BUN SERPL-MCNC: 2 MG/DL (ref 6–20)
BUN/CREAT SERPL: 4 (ref 12–20)
CALCIUM SERPL-MCNC: 8.5 MG/DL (ref 8.5–10.1)
CHLORIDE SERPL-SCNC: 109 MMOL/L (ref 97–108)
CO2 SERPL-SCNC: 24 MMOL/L (ref 21–32)
CREAT SERPL-MCNC: 0.52 MG/DL (ref 0.55–1.02)
CROSSMATCH RESULT,%XM: NORMAL
DIFFERENTIAL METHOD BLD: ABNORMAL
EOSINOPHIL # BLD: 0.2 K/UL (ref 0–0.4)
EOSINOPHIL NFR BLD: 2 % (ref 0–7)
ERYTHROCYTE [DISTWIDTH] IN BLOOD BY AUTOMATED COUNT: 24.8 % (ref 11.5–14.5)
GLOBULIN SER CALC-MCNC: 3.5 G/DL (ref 2–4)
GLUCOSE SERPL-MCNC: 122 MG/DL (ref 65–100)
HCT VFR BLD AUTO: 24.8 % (ref 35–47)
HCT VFR BLD AUTO: 27.1 % (ref 35–47)
HGB BLD-MCNC: 7.6 G/DL (ref 11.5–16)
HGB BLD-MCNC: 8.3 G/DL (ref 11.5–16)
IMM GRANULOCYTES # BLD AUTO: 0.1 K/UL (ref 0–0.04)
IMM GRANULOCYTES NFR BLD AUTO: 1 % (ref 0–0.5)
INR PPP: 1 (ref 0.9–1.1)
LYMPHOCYTES # BLD: 1.2 K/UL (ref 0.8–3.5)
LYMPHOCYTES NFR BLD: 16 % (ref 12–49)
MAGNESIUM SERPL-MCNC: 2.1 MG/DL (ref 1.6–2.4)
MCH RBC QN AUTO: 22.6 PG (ref 26–34)
MCHC RBC AUTO-ENTMCNC: 30.6 G/DL (ref 30–36.5)
MCV RBC AUTO: 73.6 FL (ref 80–99)
MONOCYTES # BLD: 0.4 K/UL (ref 0–1)
MONOCYTES NFR BLD: 5 % (ref 5–13)
NEUTS SEG # BLD: 5.7 K/UL (ref 1.8–8)
NEUTS SEG NFR BLD: 75 % (ref 32–75)
NRBC # BLD: 0.07 K/UL (ref 0–0.01)
NRBC BLD-RTO: 0.9 PER 100 WBC
PLATELET # BLD AUTO: 280 K/UL (ref 150–400)
POTASSIUM SERPL-SCNC: 3.2 MMOL/L (ref 3.5–5.1)
PROT SERPL-MCNC: 6.5 G/DL (ref 6.4–8.2)
PROTHROMBIN TIME: 10.6 SEC (ref 9–11.1)
RBC # BLD AUTO: 3.68 M/UL (ref 3.8–5.2)
RBC MORPH BLD: ABNORMAL
RBC MORPH BLD: ABNORMAL
SODIUM SERPL-SCNC: 138 MMOL/L (ref 136–145)
SPECIMEN EXP DATE BLD: NORMAL
STATUS OF UNIT,%ST: NORMAL
UNIT DIVISION, %UDIV: 0
WBC # BLD AUTO: 7.7 K/UL (ref 3.6–11)

## 2021-11-03 PROCEDURE — 74011250636 HC RX REV CODE- 250/636: Performed by: NURSE ANESTHETIST, CERTIFIED REGISTERED

## 2021-11-03 PROCEDURE — 74011250636 HC RX REV CODE- 250/636: Performed by: INTERNAL MEDICINE

## 2021-11-03 PROCEDURE — 85025 COMPLETE CBC W/AUTO DIFF WBC: CPT

## 2021-11-03 PROCEDURE — 94760 N-INVAS EAR/PLS OXIMETRY 1: CPT

## 2021-11-03 PROCEDURE — 36415 COLL VENOUS BLD VENIPUNCTURE: CPT

## 2021-11-03 PROCEDURE — 88305 TISSUE EXAM BY PATHOLOGIST: CPT

## 2021-11-03 PROCEDURE — 77030019988 HC FCPS ENDOSC DISP BSC -B: Performed by: INTERNAL MEDICINE

## 2021-11-03 PROCEDURE — 74011000250 HC RX REV CODE- 250: Performed by: NURSE PRACTITIONER

## 2021-11-03 PROCEDURE — 83735 ASSAY OF MAGNESIUM: CPT

## 2021-11-03 PROCEDURE — 76060000032 HC ANESTHESIA 0.5 TO 1 HR: Performed by: INTERNAL MEDICINE

## 2021-11-03 PROCEDURE — 74011250637 HC RX REV CODE- 250/637: Performed by: GENERAL ACUTE CARE HOSPITAL

## 2021-11-03 PROCEDURE — 0DJD8ZZ INSPECTION OF LOWER INTESTINAL TRACT, VIA NATURAL OR ARTIFICIAL OPENING ENDOSCOPIC: ICD-10-PCS | Performed by: INTERNAL MEDICINE

## 2021-11-03 PROCEDURE — 80053 COMPREHEN METABOLIC PANEL: CPT

## 2021-11-03 PROCEDURE — 85018 HEMOGLOBIN: CPT

## 2021-11-03 PROCEDURE — 76040000007: Performed by: INTERNAL MEDICINE

## 2021-11-03 PROCEDURE — 0DB58ZX EXCISION OF ESOPHAGUS, VIA NATURAL OR ARTIFICIAL OPENING ENDOSCOPIC, DIAGNOSTIC: ICD-10-PCS | Performed by: INTERNAL MEDICINE

## 2021-11-03 PROCEDURE — C9113 INJ PANTOPRAZOLE SODIUM, VIA: HCPCS | Performed by: NURSE PRACTITIONER

## 2021-11-03 PROCEDURE — 74011000250 HC RX REV CODE- 250: Performed by: NURSE ANESTHETIST, CERTIFIED REGISTERED

## 2021-11-03 PROCEDURE — 74011250636 HC RX REV CODE- 250/636: Performed by: GENERAL ACUTE CARE HOSPITAL

## 2021-11-03 PROCEDURE — 85610 PROTHROMBIN TIME: CPT

## 2021-11-03 PROCEDURE — 65660000000 HC RM CCU STEPDOWN

## 2021-11-03 PROCEDURE — 2709999900 HC NON-CHARGEABLE SUPPLY: Performed by: INTERNAL MEDICINE

## 2021-11-03 PROCEDURE — 74011000258 HC RX REV CODE- 258: Performed by: NURSE ANESTHETIST, CERTIFIED REGISTERED

## 2021-11-03 PROCEDURE — 74011250636 HC RX REV CODE- 250/636: Performed by: NURSE PRACTITIONER

## 2021-11-03 PROCEDURE — 2709999900 HC NON-CHARGEABLE SUPPLY

## 2021-11-03 RX ORDER — GLYCOPYRROLATE 0.2 MG/ML
INJECTION INTRAMUSCULAR; INTRAVENOUS AS NEEDED
Status: DISCONTINUED | OUTPATIENT
Start: 2021-11-03 | End: 2021-11-03 | Stop reason: HOSPADM

## 2021-11-03 RX ORDER — CYANOCOBALAMIN 1000 UG/ML
1000 INJECTION, SOLUTION INTRAMUSCULAR; SUBCUTANEOUS DAILY
Status: DISCONTINUED | OUTPATIENT
Start: 2021-11-03 | End: 2021-11-04 | Stop reason: HOSPADM

## 2021-11-03 RX ORDER — FLUMAZENIL 0.1 MG/ML
0.2 INJECTION INTRAVENOUS
Status: DISCONTINUED | OUTPATIENT
Start: 2021-11-03 | End: 2021-11-03 | Stop reason: HOSPADM

## 2021-11-03 RX ORDER — SODIUM CHLORIDE 0.9 % (FLUSH) 0.9 %
5-40 SYRINGE (ML) INJECTION EVERY 8 HOURS
Status: DISCONTINUED | OUTPATIENT
Start: 2021-11-03 | End: 2021-11-04 | Stop reason: HOSPADM

## 2021-11-03 RX ORDER — EPINEPHRINE 0.1 MG/ML
1 INJECTION INTRACARDIAC; INTRAVENOUS
Status: DISCONTINUED | OUTPATIENT
Start: 2021-11-03 | End: 2021-11-03 | Stop reason: HOSPADM

## 2021-11-03 RX ORDER — NALOXONE HYDROCHLORIDE 0.4 MG/ML
0.4 INJECTION, SOLUTION INTRAMUSCULAR; INTRAVENOUS; SUBCUTANEOUS
Status: DISCONTINUED | OUTPATIENT
Start: 2021-11-03 | End: 2021-11-03 | Stop reason: HOSPADM

## 2021-11-03 RX ORDER — PROPOFOL 10 MG/ML
INJECTION, EMULSION INTRAVENOUS AS NEEDED
Status: DISCONTINUED | OUTPATIENT
Start: 2021-11-03 | End: 2021-11-03 | Stop reason: HOSPADM

## 2021-11-03 RX ORDER — SODIUM CHLORIDE 0.9 % (FLUSH) 0.9 %
5-40 SYRINGE (ML) INJECTION AS NEEDED
Status: DISCONTINUED | OUTPATIENT
Start: 2021-11-03 | End: 2021-11-04 | Stop reason: HOSPADM

## 2021-11-03 RX ORDER — DEXTROMETHORPHAN/PSEUDOEPHED 2.5-7.5/.8
1.2 DROPS ORAL
Status: DISCONTINUED | OUTPATIENT
Start: 2021-11-03 | End: 2021-11-03 | Stop reason: HOSPADM

## 2021-11-03 RX ORDER — POTASSIUM CHLORIDE 20 MEQ/1
40 TABLET, EXTENDED RELEASE ORAL
Status: COMPLETED | OUTPATIENT
Start: 2021-11-03 | End: 2021-11-03

## 2021-11-03 RX ORDER — ATROPINE SULFATE 0.1 MG/ML
0.5 INJECTION INTRAVENOUS
Status: DISCONTINUED | OUTPATIENT
Start: 2021-11-03 | End: 2021-11-03 | Stop reason: HOSPADM

## 2021-11-03 RX ORDER — LIDOCAINE HYDROCHLORIDE 20 MG/ML
INJECTION, SOLUTION EPIDURAL; INFILTRATION; INTRACAUDAL; PERINEURAL AS NEEDED
Status: DISCONTINUED | OUTPATIENT
Start: 2021-11-03 | End: 2021-11-03 | Stop reason: HOSPADM

## 2021-11-03 RX ORDER — SODIUM CHLORIDE 9 MG/ML
25 INJECTION, SOLUTION INTRAVENOUS CONTINUOUS
Status: DISPENSED | OUTPATIENT
Start: 2021-11-03 | End: 2021-11-03

## 2021-11-03 RX ADMIN — CYANOCOBALAMIN 1000 MCG: 1000 INJECTION, SOLUTION INTRAMUSCULAR; SUBCUTANEOUS at 20:07

## 2021-11-03 RX ADMIN — DEXMEDETOMIDINE HYDROCHLORIDE 10 MCG: 100 INJECTION, SOLUTION, CONCENTRATE INTRAVENOUS at 10:32

## 2021-11-03 RX ADMIN — SODIUM CHLORIDE 40 MG: 9 INJECTION INTRAMUSCULAR; INTRAVENOUS; SUBCUTANEOUS at 08:38

## 2021-11-03 RX ADMIN — Medication 10 ML: at 21:35

## 2021-11-03 RX ADMIN — PROPOFOL 50 MG: 10 INJECTION, EMULSION INTRAVENOUS at 10:45

## 2021-11-03 RX ADMIN — PROPOFOL 80 MG: 10 INJECTION, EMULSION INTRAVENOUS at 10:28

## 2021-11-03 RX ADMIN — GLYCOPYRROLATE 0.2 MG: 0.2 INJECTION, SOLUTION INTRAMUSCULAR; INTRAVENOUS at 10:40

## 2021-11-03 RX ADMIN — PROPOFOL 40 MG: 10 INJECTION, EMULSION INTRAVENOUS at 10:36

## 2021-11-03 RX ADMIN — SODIUM CHLORIDE 10 ML: 9 INJECTION, SOLUTION INTRAMUSCULAR; INTRAVENOUS; SUBCUTANEOUS at 21:35

## 2021-11-03 RX ADMIN — SODIUM CHLORIDE 40 MG: 9 INJECTION INTRAMUSCULAR; INTRAVENOUS; SUBCUTANEOUS at 20:07

## 2021-11-03 RX ADMIN — POTASSIUM CHLORIDE 40 MEQ: 20 TABLET, EXTENDED RELEASE ORAL at 20:08

## 2021-11-03 RX ADMIN — SODIUM CHLORIDE 10 ML: 9 INJECTION, SOLUTION INTRAMUSCULAR; INTRAVENOUS; SUBCUTANEOUS at 05:51

## 2021-11-03 RX ADMIN — PROPOFOL 40 MG: 10 INJECTION, EMULSION INTRAVENOUS at 10:34

## 2021-11-03 RX ADMIN — PROPOFOL 40 MG: 10 INJECTION, EMULSION INTRAVENOUS at 10:32

## 2021-11-03 RX ADMIN — DEXMEDETOMIDINE HYDROCHLORIDE 10 MCG: 100 INJECTION, SOLUTION, CONCENTRATE INTRAVENOUS at 10:38

## 2021-11-03 RX ADMIN — SODIUM CHLORIDE 25 ML/HR: 9 INJECTION, SOLUTION INTRAVENOUS at 10:06

## 2021-11-03 RX ADMIN — LIDOCAINE HYDROCHLORIDE 100 MG: 20 INJECTION, SOLUTION EPIDURAL; INFILTRATION; INTRACAUDAL; PERINEURAL at 10:28

## 2021-11-03 NOTE — PROGRESS NOTES
TRANSFER - OUT REPORT:    Verbal report given to KEL Pringle on Agustina An  being transferred to Neuro/Tele for routine progression of care       Report consisted of patients Situation, Background, Assessment and   Recommendations(SBAR). Information from the following report(s) SBAR was reviewed with the receiving nurse. Lines:   Peripheral IV 11/01/21 Left Antecubital (Active)   Site Assessment Clean, dry, & intact 11/02/21 0256   Phlebitis Assessment 0 11/02/21 0256   Infiltration Assessment 0 11/02/21 0256   Dressing Status Clean, dry, & intact 11/02/21 0256   Dressing Type Tape; Transparent 11/02/21 0256   Hub Color/Line Status Pink; Infusing 11/02/21 0256   Alcohol Cap Used No 11/01/21 1828       Peripheral IV 11/01/21 Left Antecubital (Active)   Site Assessment Clean, dry, & intact 11/02/21 0256   Phlebitis Assessment 0 11/02/21 0256   Infiltration Assessment 0 11/02/21 0256   Dressing Status Clean, dry, & intact 11/02/21 0256   Dressing Type Tape; Transparent 11/02/21 0256   Hub Color/Line Status Pink; Infusing 11/02/21 0256   Alcohol Cap Used No 11/01/21 1828        Opportunity for questions and clarification was provided. Diagnoses, VS in PACU and use of AMBU in procedure room with possible bruising/soreness to be expected all conveyed to KEL Courtney.

## 2021-11-03 NOTE — ANESTHESIA POSTPROCEDURE EVALUATION
Procedure(s):  ESOPHAGOGASTRODUODENOSCOPY (EGD)  COLONOSCOPY  ESOPHAGOGASTRODUODENAL (EGD) BIOPSY. MAC, total IV anesthesia    Anesthesia Post Evaluation        Patient location during evaluation: PACU  Note status: Adequate. Level of consciousness: responsive to verbal stimuli and sleepy but conscious  Pain management: satisfactory to patient  Airway patency: patent  Anesthetic complications: no  Cardiovascular status: acceptable  Respiratory status: acceptable  Hydration status: acceptable  Comments: +Post-Anesthesia Evaluation and Assessment    Patient: Leopold Catching MRN: [id number]  SSN: [id number]   YOB: 1970  Age: 46 y.o. Sex: female      Cardiovascular Function/Vital Signs    BP (!) 111/56   Pulse (!) 104   Temp 36.3 °C (97.4 °F)   Resp 22   Ht 5' 8\" (1.727 m)   Wt 91.6 kg (201 lb 15.1 oz)   SpO2 97%   BMI 30.71 kg/m²     Patient is status post Procedure(s):  ESOPHAGOGASTRODUODENOSCOPY (EGD)  COLONOSCOPY  ESOPHAGOGASTRODUODENAL (EGD) BIOPSY. Nausea/Vomiting: Controlled. Postoperative hydration reviewed and adequate. Pain:  Pain Scale 1: Numeric (0 - 10) (11/03/21 1010)  Pain Intensity 1: 0 (11/03/21 1010)   Managed. Neurological Status: At baseline. Mental Status and Level of Consciousness: Arousable. Pulmonary Status:   O2 Device: None (11/03/21 1054)   Adequate oxygenation and airway patent. Complications related to anesthesia: None    Post-anesthesia assessment completed. No concerns. Signed By: Yunior Zapata MD    11/3/2021  Post anesthesia nausea and vomiting:  controlled      INITIAL Post-op Vital signs: No vitals data found for the desired time range.

## 2021-11-03 NOTE — H&P
Date of Surgery Update:  Leopold Catching was seen and examined. History and physical has been reviewed. The patient has been examined.  There have been no significant clinical changes since the completion of the originally dated History and Physical.    Signed By: Delmon Romberg, MD     November 3, 2021 10:25 AM

## 2021-11-03 NOTE — ANESTHESIA PREPROCEDURE EVALUATION
Relevant Problems   HEMATOLOGY   (+) Severe anemia       Anesthetic History               Review of Systems / Medical History  Patient summary reviewed, nursing notes reviewed and pertinent labs reviewed    Pulmonary                Comments: : Former Smoker - Quit 2004 - 5 pack years   Neuro/Psych       CVA  TIA and headaches    Comments: H/O cerebral aneurysm s/p clipping (1998)    Vertigo Cardiovascular                  Exercise tolerance: >4 METS  Comments: ECG (11/1/21):   Sinus tachycardia   Nonspecific ST abnormality   When compared with ECG of 21-SEP-2015 23:50,   ST now depressed in Anterior leads   Nonspecific T wave abnormality now evident in Inferior leads    GI/Hepatic/Renal               Comments: GI Bleed Endo/Other        Obesity and anemia    Comments: Iron deficiency anemia Other Findings            Physical Exam    Airway  Mallampati: II  TM Distance: > 6 cm  Neck ROM: normal range of motion   Mouth opening: Normal     Cardiovascular  Regular rate and rhythm,  S1 and S2 normal,  no murmur, click, rub, or gallop             Dental  No notable dental hx       Pulmonary  Breath sounds clear to auscultation               Abdominal  GI exam deferred       Other Findings            Anesthetic Plan    ASA: 3  Anesthesia type: MAC and total IV anesthesia          Induction: Intravenous  Anesthetic plan and risks discussed with: Patient

## 2021-11-03 NOTE — PROCEDURES
NAME:  Carol Chavez   :   1970   MRN:   [id number]     Date/Time:  11/3/2021 10:55 AM    Colonoscopy Operative Report    Procedure Type:  Colonoscopy --diagnostic     Indications: KIRBY, chronic   Pre-operative Diagnosis: see indication above  Post-operative Diagnosis:  See findings below  :  Rehan Cook MD  Referring Provider: -None    Exam:  Airway: clear, no airway problems anticipated  Heart: RRR, without gallops or rubs  Lungs: clear bilaterally without wheezes, crackles, or rhonchi  Abdomen: soft, nontender, nondistended, bowel sounds present  Mental Status: awake, alert and oriented to person, place and time    Sedation:  MAC anesthesia Propofol  Procedure Details:  After informed consent was obtained with all risks and benefits of procedure explained and preoperative exam completed, the patient was taken to the endoscopy suite and placed in the left lateral decubitus position. Upon sequential sedation as per above, a digital rectal exam was performed demonstrating internal and external hemorrhoids. The Olympus videocolonoscope  was inserted in the rectum and carefully advanced to the terminal ileum. The quality of preparation was fair. The colonoscope was slowly withdrawn with careful evaluation between folds. Retroflexion in the rectum was completed demonstrating internal and external hemorrhoids. Findings:   ANUS: Anal exam reveals no masses but hemorrhoids, sphincter tone is normal.   RECTUM: Rectal exam reveals no masses but hemorrhoids. SIGMOID COLON: Diverticulosis, otherwise normal.  DESCENDING COLON: Diverticulosis, otherwise normal.  TRANSVERSE COLON: Diverticulosis, otherwise normal.  ASCENDING COLON: Diverticulosis, otherwise normal.  CECUM: The appendiceal orifice appears normal. The ileocecal valve appears normal.   TERMINAL ILEUM: The terminal ileum was normal.      Specimen Removed:  none  Complications:  None. EBL:     None.     Impression:  -- mild, pan-colonic diverticulosis  -- internal and external hemorrhoids    Recommendations:   -- high fiber diet  -- repeat colonoscopy in 5 years  -- resume diet  -- follow up in office   -- if KIRBY persistent, would plan for capsule endoscopy  -- will sign off    Discharge Disposition:  Back to wards    Deandre Edwards MD

## 2021-11-03 NOTE — PROGRESS NOTES
Received message from patient's nurse stating:    Pt came in with severe anemia has required several blood transfusions. pt is pending EGD & colonoscopy today but she has not been able to have a BM since yesterday morning. Active BS, Bowel prep almost done. Pt reports being \"too full\" for anything else. Discussion / orders:    Maria L's enema x 1         Please note that this note was dictated using Dragon computer voice recognition software. Quite often unanticipated grammatical, syntax, homophones, and other interpretive errors are inadvertently transcribed by the computer software. Please disregard these errors. Please excuse any errors that have escaped final proofreading.

## 2021-11-03 NOTE — PROGRESS NOTES
Endoscope was pre-cleaned at the bedside immediately following procedure by Marcia STEARNS. For medications administered by anesthesia, see anesthesia chart.

## 2021-11-03 NOTE — ROUTINE PROCESS
Arianne Alpers  1970  [id number]    Situation:  Verbal report received from: Flaco Vinson  Procedure: Procedure(s):  ESOPHAGOGASTRODUODENOSCOPY (EGD)  COLONOSCOPY  ESOPHAGOGASTRODUODENAL (EGD) BIOPSY    Background:    Preoperative diagnosis: Iron deficiency anemia due to chronic blood loss [D50.0]  Postoperative diagnosis: EGD Large Hiatal Hernia, esophagitis  COLON:diverticulosis, hemorrhoids      :  Dr. Amber Ashby  Assistant(s): Endoscopy Technician-1: Vinnie Larson  Endoscopy RN-1: Julissa Curtis    Specimens:   ID Type Source Tests Collected by Time Destination   1 : Bridgette Mar Fernando 1490  Renan Thomas MD 11/3/2021 1034 Pathology     H. Pylori  no    Assessment:  Intra-procedure medications       Anesthesia gave intra-procedure sedation and medications, see anesthesia flow sheet yes    Intravenous fluids: NS@ KVO     Vital signs stable yes    Abdominal assessment: round and soft yes    Recommendation:  Discharge patient per MD clay.   Return to [address] [place] [address] Avenue or Friend yes  Permission to share finding with family or friend yes

## 2021-11-03 NOTE — PROGRESS NOTES
Hospitalist Progress Note    NAME: Alaine Najjar   :  1970   MRN:  [id number]       Assessment / Plan:  Microcytic anemia  Suspect chronic GI blood loss  Iron deficiency anemia  B12 defficiency   -Hemoglobin 3.7 on admission  -FOBT positive  -CT abdomen suggestive of mild chronic inflammatory colitis  -S/p 3 units of PRBC, recheck hemoglobin pending, transfuse PRBC to keep hemoglobin greater than 7  -Continue serial H&H  -Avoid antiplatelets/anticoagulant  -EGD: large hiatal hernia, a possible source of chronic blood loss, though no apparent acute bleeding. irregular z-ine, biopsied to rule out Torres's esophagus  -Colonoscopy: mild, pan-colonic diverticulosis, Internal and external hemorrhoids  -GI recs PPI, high fiber diet, repeat colonoscopy in 5 years, if KIRBY persistent, would plan for capsule endoscopy  -start B12 IV  -monitor CBC as outpt    Tobacco use  -Quit smoking  restarted about 2 years ago and smokes 1-1.5 packs/day per  the patient states she does not smoke that much  -Clear lungs, normal chest x-ray, normal O2 sats  -Given cessation counseling  -Declines nicotine replacement for now     History of migraines  -Takes amitriptyline and topiramate     Seasonal allergies  -Zyrtec     Code Status: Full  Surrogate Decision Maker:      DVT Prophylaxis: SCDs  GI Prophylaxis: not indicated     Anticipated discharge in AM if Hb stable      Subjective:     Chief Complaint / Reason for Physician Visit  No complaints      Review of Systems:  Symptom Y/N Comments  Symptom Y/N Comments   Fever/Chills    Chest Pain     Poor Appetite    Edema     Cough    Abdominal Pain     Sputum    Joint Pain     SOB/BOWENS    Pruritis/Rash     Nausea/vomit    Tolerating PT/OT     Diarrhea    Tolerating Diet     Constipation    Other       Could NOT obtain due to:      Objective:     VITALS:   Last 24hrs VS reviewed since prior progress note.  Most recent are:  Patient Vitals for the past 24 hrs:   Temp Pulse Resp BP SpO2   11/03/21 1518 97.7 °F (36.5 °C) 97 16 (!) 156/69 98 %   11/03/21 1222 97.5 °F (36.4 °C) 86 18 123/68 98 %   11/03/21 1132 -- 94 19 (!) 134/59 99 %   11/03/21 1118 -- 95 18 138/73 99 %   11/03/21 1115 -- 95 12 130/73 98 %   11/03/21 1112 -- 93 16 137/77 99 %   11/03/21 1109 -- 97 15 127/63 99 %   11/03/21 1106 -- 95 14 130/76 99 %   11/03/21 1103 -- 96 16 (!) 114/55 99 %   11/03/21 1100 97.6 °F (36.4 °C) 97 18 (!) 125/57 98 %   11/03/21 1054 -- (!) 104 22 (!) 111/56 97 %   11/03/21 1010 97.4 °F (36.3 °C) 93 18 (!) 142/73 98 %   11/03/21 0828 97.8 °F (36.6 °C) 90 16 127/67 --   11/03/21 0400 97.9 °F (36.6 °C) 94 16 (!) 130/57 98 %   11/03/21 0014 97.9 °F (36.6 °C) 96 18 118/61 98 %   11/02/21 1922 98 °F (36.7 °C) 99 18 127/63 98 %   11/02/21 1853 -- -- -- 130/70 --   11/02/21 1851 97.8 °F (36.6 °C) 98 16 133/77 100 %       Intake/Output Summary (Last 24 hours) at 11/3/2021 1822  Last data filed at 11/3/2021 1100  Gross per 24 hour   Intake 640 ml   Output --   Net 640 ml        I had a face to face encounter and independently examined this patient on 11/3/2021, as outlined below:  PHYSICAL EXAM:  General: WD, WN. Alert, cooperative, no acute distress    EENT:  EOMI. Anicteric sclerae. MMM  Resp:  CTA bilaterally, no wheezing or rales. No accessory muscle use  CV:  Regular  rhythm,  No edema  GI:  Soft, Non distended, Non tender. +Bowel sounds  Neurologic:  Alert and oriented X 3, normal speech,   Psych:   Good insight. Not anxious nor agitated  Skin:  No rashes.   No jaundice    Reviewed most current lab test results and cultures  YES  Reviewed most current radiology test results   YES  Review and summation of old records today    NO  Reviewed patient's current orders and MAR    YES  PMH/SH reviewed - no change compared to H&P  ________________________________________________________________________  Care Plan discussed with:    Comments   Patient x    Family      RN x    Care Manager x Consultant                       x Multidiciplinary team rounds were held today with , nursing, pharmacist and clinical coordinator. Patient's plan of care was discussed; medications were reviewed and discharge planning was addressed. ________________________________________________________________________  Total NON critical care TIME:  30   Minutes    Total CRITICAL CARE TIME Spent:   Minutes non procedure based      Comments   >50% of visit spent in counseling and coordination of care     ________________________________________________________________________  Denver Flavors, MD     Procedures: see electronic medical records for all procedures/Xrays and details which were not copied into this note but were reviewed prior to creation of Plan. LABS:  I reviewed today's most current labs and imaging studies. Pertinent labs include:  Recent Labs     11/03/21  1230 11/03/21  0248 11/02/21  1230 11/02/21  0252 11/01/21  1303   WBC 7.7  --   --   --  9.0   HGB 8.3* 7.6* 6.5*   < > 3.7*   HCT 27.1* 24.8* 21.6*   < > 14.2*     --   --   --  309    < > = values in this interval not displayed.      Recent Labs     11/03/21  1230 11/02/21  0252 11/01/21  1303    138 137   K 3.2* 3.4* 3.5   * 114* 108   CO2 24 18* 20*   * 113* 162*   BUN 2* 8 9   CREA 0.52* 0.47* 0.66   CA 8.5 8.0* 8.5   MG 2.1  --   --    ALB 3.0*  --  3.1*   TBILI 0.6  --  0.3   ALT 20  --  18   INR 1.0  --   --        Signed: Denver Flavors, MD

## 2021-11-03 NOTE — PROCEDURES
NAME:  Nelsy Maria   :   1970   MRN:   [id number]     Date/Time:  11/3/2021 10:25 AM    Esophagogastroduodenoscopy (EGD) Procedure Note    Procedure: Esophagogastroduodenoscopy with biopsy    Indication: KIRBY  Pre-operative Diagnosis: see indication above  Post-operative Diagnosis: see findings below  :  Jasmin Mariscal MD  Referring Provider:   -None    Exam:  Airway: clear, no airway problems anticipated  Heart: RRR, without gallops or rubs  Lungs: clear bilaterally without wheezes, crackles, or rhonchi  Abdomen: soft, nontender, nondistended, bowel sounds present  Mental Status: awake, alert and oriented to person, place and time     Anethesia/Sedation:  MAC anesthesia Propofol  Procedure Details   After informed consent was obtained for the procedure, with all risks and benefits of procedure explained the patient was taken to the endoscopy suite and placed in the left lateral decubitus position. Following sequential administration of sedation as per above, the [id number] gastroscope was inserted into the mouth and advanced under direct vision to second portion of the duodenum. A careful inspection was made as the gastroscope was withdrawn, including a retroflexed view of the proximal stomach; findings and interventions are described below. Findings:   OROPHARYNX: Cords and pyriform recesses normal.   ESOPHAGUS:   -- distal esophagus with mild z-line irregularity, biopsied and reflux esophagitis, which is mild and non-bleeding  -- rest of the esophagus is normal.   STOMACH: The fundus on antegrade and retroflex views reveals a large sliding hiatal hernia from 32 cm to 38 from the central incisors. The body, antrum, and pylorus are normal.  DUODENUM: The bulb, second portions are normal.    Therapies: biopsy of esophagus    Specimens: GE junction    EBL:  None. Complications:   None; patient tolerated the procedure well.            Impression:  -- large hiatal hernia, a possible source of chronic blood loss, though no apparent acute bleeding  -- irregular z-ine, biopsied to rule out Torres's esophagus    Recommendations:  -- await pathology  -- PPI daily  -- proceed to colonoscopy    Discharge disposition:  Back to wards    Faisal Blunt MD

## 2021-11-03 NOTE — PROGRESS NOTES
Pt reported no BM since yesterday AM while having scheduled EGD and colonoscopy today. Bowel prep jug almost empty by 0000 pt report being \"Too full\" to drink anything else and still had not started going to the bathroom. ACNP on call paged new order for fleets received. Pt refused at that time reported \"I don't think I need it\". Pt stated \"I went while you were gone I had a large, yellow liquid bowel movement\". Noted I just want to ensure proper prep is complete for best exam outcome. Pt reported seeing blood tinged urine yesterday but denies feeling like she is retaining urine. Pt denies seeing any s/s of bleeding. Pt could not void for a post void bladder scan when I woke her up to assess her bladder but there was only 42ml retained. Pt advised that's normal.     Pt not clear headed at this time so consent not signed. Pt I normally 0x4 but she has had a lot of broken sleep.        Jc Hernandez RN

## 2021-11-03 NOTE — PROGRESS NOTES
Transition of Care Plan:    RUR: 9% Low   Disposition: Home with family-eval pending for additional services   Follow up appointments: Follow up with PCP and/or Specialist   DME needed: N/A  Transportation at [address]  [facility] to assist   [facility] or means to access home:   Family to provide   IM Medicare Letter: N/A  Is patient a BCPI-A Bundle:  N/A      If yes, was Bundle Letter given?:     Caregiver Contact:Lux Lockwood (spouse) [phone]  Discharge Caregiver contacted prior to discharge? Family to be contacted     Reason for Admission:  Hypochromic Microcytic Anemia                      RUR Score:       9%           Plan for utilizing home health:      N/A    PCP: First and Last name:  None     Name of Practice:    Are you a current patient: Yes/No: No-pt will require a new PCP appointment    Approximate date of last visit: Last seen 2015   Can you participate in a virtual visit with your PCP: Yes, if recommended by QING Macedo (ACP) Conversation      Date of Conversation: 11/3/21  Conducted with: Patient with [address]:   No healthcare decision makers have been documented. Click here to complete [address] including selection of the Healthcare Decision Maker Relationship (ie \"Primary\")    Today we documented Decision Maker(s) consistent with Legal Next of Kin hierarchy. Content/Action Overview:   DECLINED ACP conversation - will revisit periodically   Reviewed DNR/DNI and patient elects Full Code (Attempt Resuscitation)      Length of Voluntary ACP Conversation in minutes:  16 minutes    April Giron                     Transition of Care Plan:                      CM completed room visit with pt, with spouse, by bedside. Pt reported that she lives with family in their one story home. Pt will require a new pt appointment, at the time of d/c. Pt was last seen 2015.   Pt is known to use [facility]. Pt is known to be independent with ADLs, and drive. Pt will assist with transport home at the time of d/c. Pt reported no HHC, SNF, and DME at home. Pt denies additional needs at the time of d/c. CM will continue to follow. Care Management Interventions  PCP Verified by CM: Yes  Mode of Transport at Discharge:  Other (see comment)  Transition of Care Consult (CM Consult): Discharge Planning  Discharge Durable Medical Equipment: No  Physical Therapy Consult: No  Occupational Therapy Consult: No  Speech Therapy Consult: No  Support Systems: Spouse/Significant Other  Confirm Follow Up Transport: Family  Discharge Location  Discharge Placement: [facility]/ Jame Murillo 41, MSW, [facility]

## 2021-11-04 VITALS
HEIGHT: 68 IN | WEIGHT: 201.94 LBS | RESPIRATION RATE: 18 BRPM | BODY MASS INDEX: 30.61 KG/M2 | TEMPERATURE: 99.3 F | OXYGEN SATURATION: 99 % | SYSTOLIC BLOOD PRESSURE: 141 MMHG | HEART RATE: 94 BPM | DIASTOLIC BLOOD PRESSURE: 90 MMHG

## 2021-11-04 LAB
ANION GAP SERPL CALC-SCNC: 6 MMOL/L (ref 5–15)
BASOPHILS # BLD: 0 K/UL (ref 0–0.1)
BASOPHILS NFR BLD: 0 % (ref 0–1)
BUN SERPL-MCNC: 4 MG/DL (ref 6–20)
BUN/CREAT SERPL: 7 (ref 12–20)
CALCIUM SERPL-MCNC: 8.3 MG/DL (ref 8.5–10.1)
CHLORIDE SERPL-SCNC: 112 MMOL/L (ref 97–108)
CO2 SERPL-SCNC: 23 MMOL/L (ref 21–32)
CREAT SERPL-MCNC: 0.54 MG/DL (ref 0.55–1.02)
DIFFERENTIAL METHOD BLD: ABNORMAL
EOSINOPHIL # BLD: 0.2 K/UL (ref 0–0.4)
EOSINOPHIL NFR BLD: 2 % (ref 0–7)
ERYTHROCYTE [DISTWIDTH] IN BLOOD BY AUTOMATED COUNT: 25.7 % (ref 11.5–14.5)
EST. AVERAGE GLUCOSE BLD GHB EST-MCNC: 126 MG/DL
GLUCOSE SERPL-MCNC: 125 MG/DL (ref 65–100)
HBA1C MFR BLD: 6 % (ref 4–5.6)
HCT VFR BLD AUTO: 24.9 % (ref 35–47)
HGB BLD-MCNC: 7.6 G/DL (ref 11.5–16)
IMM GRANULOCYTES # BLD AUTO: 0.1 K/UL (ref 0–0.04)
IMM GRANULOCYTES NFR BLD AUTO: 1 % (ref 0–0.5)
LYMPHOCYTES # BLD: 2.1 K/UL (ref 0.8–3.5)
LYMPHOCYTES NFR BLD: 26 % (ref 12–49)
MCH RBC QN AUTO: 22.3 PG (ref 26–34)
MCHC RBC AUTO-ENTMCNC: 30.5 G/DL (ref 30–36.5)
MCV RBC AUTO: 73 FL (ref 80–99)
MONOCYTES # BLD: 0.5 K/UL (ref 0–1)
MONOCYTES NFR BLD: 6 % (ref 5–13)
NEUTS SEG # BLD: 5.2 K/UL (ref 1.8–8)
NEUTS SEG NFR BLD: 65 % (ref 32–75)
NRBC # BLD: 0.06 K/UL (ref 0–0.01)
NRBC BLD-RTO: 0.7 PER 100 WBC
PLATELET # BLD AUTO: 254 K/UL (ref 150–400)
POTASSIUM SERPL-SCNC: 3.5 MMOL/L (ref 3.5–5.1)
RBC # BLD AUTO: 3.41 M/UL (ref 3.8–5.2)
RBC MORPH BLD: ABNORMAL
SODIUM SERPL-SCNC: 141 MMOL/L (ref 136–145)
WBC # BLD AUTO: 8.1 K/UL (ref 3.6–11)

## 2021-11-04 PROCEDURE — 85025 COMPLETE CBC W/AUTO DIFF WBC: CPT

## 2021-11-04 PROCEDURE — 36415 COLL VENOUS BLD VENIPUNCTURE: CPT

## 2021-11-04 PROCEDURE — 74011250636 HC RX REV CODE- 250/636: Performed by: NURSE PRACTITIONER

## 2021-11-04 PROCEDURE — 74011000250 HC RX REV CODE- 250: Performed by: NURSE PRACTITIONER

## 2021-11-04 PROCEDURE — 74011250636 HC RX REV CODE- 250/636: Performed by: GENERAL ACUTE CARE HOSPITAL

## 2021-11-04 PROCEDURE — 83036 HEMOGLOBIN GLYCOSYLATED A1C: CPT

## 2021-11-04 PROCEDURE — 80048 BASIC METABOLIC PNL TOTAL CA: CPT

## 2021-11-04 PROCEDURE — C9113 INJ PANTOPRAZOLE SODIUM, VIA: HCPCS | Performed by: NURSE PRACTITIONER

## 2021-11-04 PROCEDURE — 74011250637 HC RX REV CODE- 250/637: Performed by: INTERNAL MEDICINE

## 2021-11-04 RX ORDER — PANTOPRAZOLE SODIUM 40 MG/1
40 GRANULE, DELAYED RELEASE ORAL
Qty: 30 EACH | Refills: 0 | Status: SHIPPED | OUTPATIENT
Start: 2021-11-04 | End: 2021-12-04

## 2021-11-04 RX ORDER — CYANOCOBALAMIN, ISOPROPYL ALCOHOL 1000MCG/ML
1000 KIT INJECTION
Qty: 4 KIT | Refills: 0 | Status: SHIPPED | OUTPATIENT
Start: 2021-11-04 | End: 2021-11-26

## 2021-11-04 RX ADMIN — CYANOCOBALAMIN 1000 MCG: 1000 INJECTION, SOLUTION INTRAMUSCULAR; SUBCUTANEOUS at 09:59

## 2021-11-04 RX ADMIN — ACETAMINOPHEN 650 MG: 325 TABLET ORAL at 01:27

## 2021-11-04 RX ADMIN — SODIUM CHLORIDE 10 ML: 9 INJECTION, SOLUTION INTRAMUSCULAR; INTRAVENOUS; SUBCUTANEOUS at 05:35

## 2021-11-04 RX ADMIN — SODIUM CHLORIDE 40 MG: 9 INJECTION INTRAMUSCULAR; INTRAVENOUS; SUBCUTANEOUS at 09:59

## 2021-11-04 RX ADMIN — Medication 10 ML: at 05:30

## 2021-11-04 NOTE — PROGRESS NOTES
Problem: Falls - Risk of  Goal: *Absence of Falls  Description: Document Uriartetrevor Vera Fall Risk and appropriate interventions in the flowsheet. Outcome: Resolved/Met  Note: Fall Risk Interventions:            Medication Interventions: Bed/chair exit alarm, Assess postural VS orthostatic hypotension, Evaluate medications/consider consulting pharmacy, Teach patient to arise slowly                   Problem: Patient Education: Go to Patient Education Activity  Goal: Patient/Family Education  Outcome: Resolved/Met     Problem: Pressure Injury - Risk of  Goal: *Prevention of pressure injury  Description: Document Andrés Scale and appropriate interventions in the flowsheet. Outcome: Resolved/Met  Note: Pressure Injury Interventions:             Activity Interventions: Increase time out of bed, Pressure redistribution bed/mattress(bed type)    Mobility Interventions: HOB 30 degrees or less, Pressure redistribution bed/mattress (bed type)    Nutrition Interventions: Document food/fluid/supplement intake                     Problem: Patient Education: Go to Patient Education Activity  Goal: Patient/Family Education  Outcome: Resolved/Met     Problem: Anemia Care Plan (Adult and Pediatric)  Goal: *Labs within defined limits  Outcome: Resolved/Met  Goal: *Tolerates increased activity  Outcome: Resolved/Met     Problem: Patient Education: Go to Patient Education Activity  Goal: Patient/Family Education  Outcome: Resolved/Met

## 2021-11-04 NOTE — PROGRESS NOTES
Transition of Care Plan:    RUR: 9% Low   Disposition: Home with family  Follow up appointments: Follow up with PCP and/or Specialist-new pt appointment established   DME needed: N/A  Transportation at [id number]  [facility] to assist   [place] or means to access home:   Family to provide   IM Medicare Letter: N/A  Is patient a BCPI-A Bundle:  N/A      If yes, was Bundle Letter given?:     Caregiver Contact:Lux Jaramillo (spouse) [phone]  Discharge Caregiver contacted prior to discharge? Family to be contacted     CM made aware that pt will d/c on today and will return home with family support. CM arrange new patient appointment-placed on AVS.  CM discussed with physician that pt's follow up appointment is scheduled one month awayy, however, pt can report to [facility] to check hemoglobin for updated labs. CM completed the needs of the pt at this time.     SOL Montilla, [address] [place] [address]

## 2021-11-04 NOTE — DISCHARGE SUMMARY
Hospitalist Discharge Summary     Patient ID:  Jaquan Devi  [id number]  46 y.o.  1970  11/1/2021    PCP on record: None    Admit date: 11/1/2021  Discharge date and time: 11/4/2021    DISCHARGE DIAGNOSIS:  Microcytic anemia  Suspect chronic GI blood loss  Iron deficiency anemia  B12 defficiency   Mild hematuria   Tobacco use    CONSULTATIONS:  IP CONSULT TO HOSPITALIST  IP CONSULT TO GASTROENTEROLOGY    Excerpted HPI from H&P of Chandra Puente DO:  Isha Hurtado is a 46 y.o.  female with past medical history of tobacco use, migraines, and seasonal allergies who presented earlier today to urgent care for fatigue and shortness of breath and was found to have a hemoglobin of 3.8. She was subsequently referred to the ED here at [facility] and repeat lab work confirmed hemoglobin of 3.7. Last blood work of record here indicates hemoglobin greater than 14 back in 2015 and patient does not follow with doctors regularly. Patient and her  were smoking mountains this past week hiking, including some hunting mountains up to almost 5000 feet elevation. Patient has been feeling weak for several weeks. She denies unintentional weight loss, gross blood per stool, or stool color changes. She is not on blood thinners. She describes feeling some general malaise and intermittently some nausea abdominal pain. According to her , he encouraged her to seek medical care but patient did not want to leave for vacation early. She denies any chest pain, or any fevers or chills. ____________________________________________________________________  DISCHARGE SUMMARY/HOSPITAL COURSE:  for full details see H&P, daily progress notes, labs, consult notes.      Microcytic anemia  Suspect chronic GI blood loss  Iron deficiency anemia  B12 defficiency   Mild hematuria   -Hemoglobin 3.7 on admission  -FOBT positive  -CT abdomen suggestive of mild chronic inflammatory colitis  -S/p 3 units of PRBC, recheck hemoglobin pending, transfuse PRBC to keep hemoglobin greater than 7  -Continue serial CBC q2 weeks  -Avoid antiplatelets/anticoagulant  -EGD: large hiatal hernia, a possible source of chronic blood loss, though no apparent acute bleeding. irregular z-ine, biopsied to rule out Torres's esophagus  -Colonoscopy: mild, pan-colonic diverticulosis, Internal and external hemorrhoids  -GI recs PPI, high fiber diet, repeat colonoscopy in 5 years, if KIRBY persistent, would plan for capsule endoscopy  -start B12 IV  -on day of DC, pt noted little blood in her urine, she flushed it. HB stable. Pt is smoker. Pt needs Uro f/u for Cystoscopy. CM notified.      Tobacco use  -Quit smoking 2004 restarted about 2 years ago and smokes 1-1.5 packs/day per  the patient states she does not smoke that much  -Clear lungs, normal chest x-ray, normal O2 sats  -Given cessation counseling  -Declines nicotine replacement for now     History of migraines  -Takes amitriptyline and topiramate     Seasonal allergies  -Zyrtec      _______________________________________________________________________  Patient seen and examined by me on discharge day. Pertinent Findings:  Gen:    Not in distress  Chest: Clear lungs  CVS:   Regular rhythm. No edema  Abd:  Soft, not distended, not tender  Neuro:  AAOX3  _______________________________________________________________________  DISCHARGE MEDICATIONS:   Current Discharge Medication List      START taking these medications    Details   cyanocobalamin, vitamin B-12, (B-12 Compliance) 1,000 mcg/mL kit 1,000 mcg by Injection route every seven (7) days for 4 doses. Qty: 4 Kit, Refills: 0  Start date: 11/4/2021, End date: 11/26/2021      pantoprazole (Protonix) 40 mg granules for oral suspension Take 40 mg by mouth Daily (before breakfast) for 30 days.   Qty: 30 Each, Refills: 0  Start date: 11/4/2021, End date: 12/4/2021         CONTINUE these medications which have NOT CHANGED Details   butalbital-acetaminophen-caffeine (FIORICET, ESGIC) -40 mg per tablet TAKE 1 TABLET BY MOUTH EVERY 6 HOURS AS NEEDED FOR HEADACHE  Qty: 20 Tab, Refills: 1    Associated Diagnoses: Other migraine without status migrainosus, not intractable; Migraine with vertigo      amitriptyline (ELAVIL) 25 mg tablet Take 1 Tab by mouth nightly. Qty: 90 Tab, Refills: 3    Associated Diagnoses: Migraine with vertigo      topiramate (TOPAMAX) 50 mg tablet Take 1 Tab by mouth two (2) times a day. Indications: migraine prevention  Qty: 180 Tab, Refills: 3    Associated Diagnoses: Migraine with vertigo      loratadine (CLARITIN) 10 mg tablet Take 10 mg by mouth. HYDROcodone-acetaminophen (NORCO) 5-325 mg per tablet Take  by mouth. cetirizine (ZYRTEC) 10 mg tablet Take 10 mg by mouth daily. Indications: SEASONAL ALLERGIC RHINITIS      acetaminophen (TYLENOL EXTRA STRENGTH) 500 mg tablet Take  by mouth every six (6) hours as needed for Pain. Patient Follow Up Instructions:    Activity: Activity as tolerated  Diet: ADULT DIET Regular  Wound Care: None needed  Follow-up with PCP as scheduled   Follow-up tests/labs cbc in 2 week      Follow-up Information     Follow up With Specialties Details Why Contact Info    Polo, Caesar Severance, NP Pediatric Medicine On 12/7/2021 APPOINTMENT TIME: 1:30PM [address]  [address] [address]  [phone]      None    None ([phone]) Patient stated that they have no PCP      Pearl Maldonado MD Gastroenterology In 2 weeks  [facility] [id number]  [address] Urology  In 1 week  [address] [address]        ________________________________________________________________    Risk of deterioration: Low    Condition at Discharge:  Stable  __________________________________________________________________    Disposition  Home with family, no needs    ____________________________________________________________________    Code Status: Full Code  ___________________________________________________________________      Total time in minutes spent coordinating this discharge (includes going over instructions, follow-up, prescriptions, and preparing report for sign off to her PCP) :  >30 minutes    Signed:  Denver Flavors, MD

## 2021-11-04 NOTE — DISCHARGE INSTRUCTIONS
Patient Education   Patient Education   Patient Education        Vitamin B12 Deficiency: Care Instructions  Overview    A vitamin B12 deficiency means that your body doesn't have enough of this vitamin. You need vitamin B12 to keep red blood cells and nerve cells healthy. Not enough B12 can cause anemia. It can also damage nerves and cause trouble with memory and thinking. Many things can cause low levels of vitamin B12. They include:  · Not getting enough of this vitamin through food. · An autoimmune problem, like pernicious anemia. · Weight-loss surgery, like gastric bypass. · Long-term use of heartburn medicines. Low levels of B12 may not cause symptoms. But symptoms may include fatigue, depression, and thinking or memory problems. You may have tingling in your hands or feet and changes in the way you walk. Treatment depends on the reason for low vitamin B12. Eating more foods rich in B12 may be enough. Or you might take the vitamin as a pill, as shots, or as nasal spray. How can you care for yourself? · Take vitamin B12 as your doctor recommends. · Go to your appointments if you are getting B12 shots. · Eat more foods rich in vitamin B12. Examples are:  ? Animal products. These include meat, seafood, milk products, poultry, and eggs. ? Foods that have B12 added. These are called fortified foods. They include soy products, nutritional yeast, and dry cereals. · Work with a nutritionist or dietitian if you need help getting more vitamin B12 from food. · Talk to your doctor about stopping medicines if they are adding to your B12 deficiency. When should you call for help? Call 911  anytime you think you may need emergency care. For example, call if:    · You passed out (lost consciousness). Call your doctor now or seek immediate medical care if:    · You are dizzy or lightheaded, or you feel like you may faint. Watch closely for changes in your health.  Be sure to call your doctor if:    · You are confused or can't think clearly.     · You don't get better as expected. Where can you learn more? Go to http://www.gray.com/  Enter B352 in the search box to learn more about \"Vitamin B12 Deficiency: Care Instructions. \"  Current as of: April 29, 2021               Content Version: 13.0  © 2006-2021 [employer]. Care instructions adapted under license by [employer] (which disclaims liability or warranty for this information). If you have questions about a medical condition or this instruction, always ask your healthcare professional. [place] [address] any warranty or liability for your use of this information. Gastrointestinal Bleeding: Care Instructions  Your Care Instructions     The digestive or gastrointestinal tract goes from the mouth to the anus. It is often called the GI tract. Bleeding can happen anywhere in the GI tract. It may be caused by an ulcer, an infection, or cancer. It may also be caused by medicines such as aspirin or ibuprofen. Light bleeding may not cause any symptoms at first. But if you continue to bleed for a while, you may feel very weak or tired. Sudden, heavy bleeding means you need to see a doctor right away. This kind of bleeding can be very dangerous. But it can usually be cured or controlled. The doctor may do some tests to find the cause of your bleeding. Follow-up care is a key part of your treatment and safety. Be sure to make and go to all appointments, and call your doctor if you are having problems. It's also a good idea to know your test results and keep a list of the medicines you take. How can you care for yourself at home? · Be safe with medicines. Take your medicines exactly as prescribed. Call your doctor if you think you are having a problem with your medicine. You will get more details on the specific medicines your doctor prescribes.   · Do not take aspirin or other anti-inflammatory medicines, such as naproxen (Aleve) or ibuprofen (Advil, Motrin), without talking to your doctor first. Ask your doctor if it is okay to use acetaminophen (Tylenol). · Do not drink alcohol. · The bleeding may make you lose iron. So it's important to eat foods that have a lot of iron. These include red meat, shellfish, poultry, and eggs. They also include beans, raisins, whole-grain breads, and leafy green vegetables. If you want help planning meals, you can make an appointment with a dietitian. When should you call for help? Call 911 anytime you think you may need emergency care. For example, call if:    · You have sudden, severe belly pain.     · You vomit blood or what looks like coffee grounds.     · You passed out (lost consciousness).     · Your stools are maroon or very bloody. Call your doctor now or seek immediate medical care if:    · You are dizzy or lightheaded, or you feel like you may faint.     · Your stools are black and look like tar, or they have streaks of blood.     · You have belly pain.     · You vomit or have nausea.     · You have trouble swallowing, or it hurts when you swallow. Watch closely for changes in your health, and be sure to contact your doctor if:    · You do not get better as expected. Where can you learn more? Go to http://www.gray.com/  Enter F981 in the search box to learn more about \"Gastrointestinal Bleeding: Care Instructions. \"  Current as of: July 1, 2021               Content Version: 13.0  © 2006-2021 [employer]. Care instructions adapted under license by [employer] (which disclaims liability or warranty for this information). If you have questions about a medical condition or this instruction, always ask your healthcare professional. [address] any warranty or liability for your use of this information.             Blood in the Urine: Care Instructions  Your Care Instructions Blood in the urine, or hematuria, may make the urine look red, brown, or pink. There may be blood every time you urinate or just from time to time. You cannot always see blood in the urine, but it will show up in a urine test.  Blood in the urine may be serious. It should always be checked by a doctor. Your doctor may recommend more tests, including an X-ray, a CT scan, or a cystoscopy (which lets a doctor look inside the urethra and bladder). Blood in the urine can be a sign of another problem. Common causes are bladder infections and kidney stones. An injury to your groin or your genital area can also cause bleeding in the urinary tract. Very hard exercise--such as running a marathon--can cause blood in the urine. Blood in the urine can also be a sign of kidney disease or cancer in the bladder or kidney. Many cases of blood in the urine are caused by a harmless condition that runs in families. This is called benign familial hematuria. It does not need any treatment. Sometimes your urine may look red or brown even though it does not contain blood. For example, not getting enough fluids (dehydration), taking certain medicines, or having a liver problem can change the color of your urine. Eating foods such as beets, rhubarb, or blackberries or foods with red food coloring can make your urine look red or pink. Follow-up care is a key part of your treatment and safety. Be sure to make and go to all appointments, and call your doctor if you are having problems. It's also a good idea to know your test results and keep a list of the medicines you take. When should you call for help? Call your doctor now or seek immediate medical care if:    · You have symptoms of a urinary infection. For example:  ? You have pus in your urine. ? You have pain in your back just below your rib cage. This is called flank pain. ? You have a fever, chills, or body aches. ? It hurts to urinate. ?  You have groin or belly pain.     · You have more blood in your urine. Watch closely for changes in your health, and be sure to contact your doctor if:    · You have new urination problems.     · You do not get better as expected. Where can you learn more? Go to http://www.gray.com/  Enter T969 in the search box to learn more about \"Blood in the Urine: Care Instructions. \"  Current as of: February 10, 2021               Content Version: 13.0  © 2006-2021 [employer], [employer]. Care instructions adapted under license by [employer] (which disclaims liability or warranty for this information). If you have questions about a medical condition or this instruction, always ask your healthcare professional. [address] any warranty or liability for your use of this information.

## 2021-11-04 NOTE — ROUTINE PROCESS
End of Shift Note Bedside shift change report given to *** (oncoming nurse) by Roly Phillips RN (offgoing nurse). Report included the following information SBAR, Kardex, Intake/Output and MAR Shift worked:  [address] Shift summary and any significant changes:  
  none Concerns for physician to address: none Zone phone for oncoming shift:   *** Patient Information Carol Castellon 46 y.o. 
11/1/2021 11:55 AM by Ayan Villalba [facility] was admitted from Home 
 
Problem List 
Patient Active Problem List  
 Diagnosis Date Noted  Severe anemia 11/01/2021  
 History of cerebral aneurysm repair 08/04/2014  Pelvic pain in female 08/24/2010 Past Medical History:  
Diagnosis Date  Anemia NEC  Aneurysm  Delivery normal   
 x3  Dermatitis  Headache   
 Headache(784.0) [address]  Stroke ([address] brain anuerysm, 1998  Vertigo Core Measures: CVA: No No 
CHF:No No 
PNA:No No 
 
Activity: 
Activity Level: Up ad ritesh Cardiac:  
Cardiac Monitoring: Yes     
Cardiac Rhythm: Sinus Rhythm Access:  
Current line(s): PIV Genitourinary:  
Urinary status: voiding Urinary Catheter? No 
 
Respiratory:  
O2 Device: None (Room air) Chronic home O2 use?: NO Incentive spirometer at bedside: NO 
  
 
GI: 
Last Bowel Movement Date: 11/03/21 Current diet:  ADULT DIET Regular Passing flatus: YES Tolerating current diet: YES Pain Management:  
Patient states pain is manageable on current regimen: YES Skin: 
Andrés Score: 22 Interventions: increase time out of bed and nutritional support Patient Safety: 
Fall Score: Total Score: 1 Interventions: gripper socks @Rollbelt 
@dexterity to release roll belt  Yes/No ( must document dexterity  here by stating Yes or No here, otherwise this is a restraint and must follow restraint documentation policy.) DVT prophylaxis: DVT prophylaxis Med-  
DVT prophylaxis SCD or BRE- Yes  
 
Wounds: (If Applicable) Wounds- {EGEQU:39799} Location *** Active Consults: 
IP CONSULT TO HOSPITALIST 
IP CONSULT TO GASTROENTEROLOGY Length of Stay: 
Expected LOS: 2d 16h Actual LOS: 3 Discharge Plan: {Discharge Plannin} *** Josh Brooks RN

## 2021-12-01 ENCOUNTER — TELEPHONE (OUTPATIENT)
Dept: FAMILY MEDICINE CLINIC | Age: 51
End: 2021-12-01

## 2021-12-01 NOTE — TELEPHONE ENCOUNTER
Pt calling about her labs;  Pt states that she has an upcoming appt with WONG Phillips but would still like to speak with her nurse about her results before she comes in; the pt also has some concerns as well; please call pt back        Thank You

## 2021-12-01 NOTE — TELEPHONE ENCOUNTER
You can call and triage patient but I cannot really give any medical advice until she is seen. She is new patient to us.

## 2021-12-02 NOTE — TELEPHONE ENCOUNTER
We can see her labs. They are in [facility] care.    Not sure what questions she has but she has never been seen here before so we need to see her as planned

## 2021-12-02 NOTE — TELEPHONE ENCOUNTER
Are you able to look into her 11/17/2021 lab results that was ordered by Dr. Olga Smyth? I don't see it in there and I am wondering do you have a different way to obtain those records.

## 2021-12-07 ENCOUNTER — OFFICE VISIT (OUTPATIENT)
Dept: FAMILY MEDICINE CLINIC | Age: 51
End: 2021-12-07
Payer: COMMERCIAL

## 2021-12-07 VITALS
BODY MASS INDEX: 30.1 KG/M2 | HEART RATE: 97 BPM | WEIGHT: 198.6 LBS | SYSTOLIC BLOOD PRESSURE: 122 MMHG | TEMPERATURE: 98 F | HEIGHT: 68 IN | OXYGEN SATURATION: 99 % | DIASTOLIC BLOOD PRESSURE: 85 MMHG | RESPIRATION RATE: 18 BRPM

## 2021-12-07 DIAGNOSIS — K44.9 HIATAL HERNIA: ICD-10-CM

## 2021-12-07 DIAGNOSIS — Z09 HOSPITAL DISCHARGE FOLLOW-UP: ICD-10-CM

## 2021-12-07 DIAGNOSIS — R31.9 HEMATURIA, UNSPECIFIED TYPE: ICD-10-CM

## 2021-12-07 DIAGNOSIS — D50.0 IRON DEFICIENCY ANEMIA DUE TO CHRONIC BLOOD LOSS: ICD-10-CM

## 2021-12-07 DIAGNOSIS — E53.8 B12 DEFICIENCY: ICD-10-CM

## 2021-12-07 DIAGNOSIS — Z11.59 ENCOUNTER FOR HEPATITIS C SCREENING TEST FOR LOW RISK PATIENT: ICD-10-CM

## 2021-12-07 DIAGNOSIS — Z00.00 ENCOUNTER FOR MEDICAL EXAMINATION TO ESTABLISH CARE: Primary | ICD-10-CM

## 2021-12-07 DIAGNOSIS — F17.200 SMOKER: ICD-10-CM

## 2021-12-07 DIAGNOSIS — Z13.220 SCREENING, LIPID: ICD-10-CM

## 2021-12-07 DIAGNOSIS — Z12.39 SPECIAL SCREENING EXAMINATION FOR NEOPLASM OF BREAST: ICD-10-CM

## 2021-12-07 LAB
BILIRUB UR QL STRIP: NEGATIVE
GLUCOSE UR-MCNC: NEGATIVE MG/DL
KETONES P FAST UR STRIP-MCNC: NEGATIVE MG/DL
PH UR STRIP: 7 [PH] (ref 4.6–8)
PROT UR QL STRIP: NEGATIVE
SP GR UR STRIP: 1.01 (ref 1–1.03)
UA UROBILINOGEN AMB POC: NORMAL (ref 0.2–1)
URINALYSIS CLARITY POC: CLEAR
URINALYSIS COLOR POC: YELLOW
URINE BLOOD POC: NEGATIVE
URINE LEUKOCYTES POC: NEGATIVE
URINE NITRITES POC: NEGATIVE

## 2021-12-07 PROCEDURE — 81003 URINALYSIS AUTO W/O SCOPE: CPT | Performed by: NURSE PRACTITIONER

## 2021-12-07 PROCEDURE — 99204 OFFICE O/P NEW MOD 45 MIN: CPT | Performed by: NURSE PRACTITIONER

## 2021-12-07 RX ORDER — CYANOCOBALAMIN 1000 UG/ML
1000 INJECTION, SOLUTION INTRAMUSCULAR; SUBCUTANEOUS
Qty: 1 ML | Refills: 11 | Status: SHIPPED | OUTPATIENT
Start: 2021-12-07 | End: 2022-11-03

## 2021-12-07 RX ORDER — PANTOPRAZOLE SODIUM 40 MG/1
TABLET, DELAYED RELEASE ORAL
COMMUNITY
Start: 2021-12-04

## 2021-12-07 RX ORDER — CYANOCOBALAMIN 1000 UG/ML
INJECTION, SOLUTION INTRAMUSCULAR; SUBCUTANEOUS
COMMUNITY
Start: 2021-11-05 | End: 2021-12-07 | Stop reason: SDUPTHER

## 2021-12-07 NOTE — PROGRESS NOTES
Subjective:     Chief Complaint   Patient presents with    New Patient   [facility] Follow Up        HPI:  Vanessa Whiteside is a 46 y.o. female is new patient, here to establish care and follow up from recent hospitalization. Previous PCP: Dr Radha Liu over 5 years ago  GYN: has not seen in several years. Had hysterectomy 2010 (took everything but right ovary)    Admitted to ED [facility] 11/1/21-11/4/21    Excerpted HPI from H&P of Sal Roberts DO:  Zahira Hernandez is a 46 y.o.  female with past medical history of tobacco use, migraines, and seasonal allergies who presented earlier today to urgent care for fatigue and shortness of breath and was found to have a hemoglobin of 3.8.  She was subsequently referred to the ED here at [facility] and repeat lab work confirmed hemoglobin of 3.7.  Last blood work of record here indicates hemoglobin greater than 14 back in 2015 and patient does not follow with doctors regularly. Adrian Cano and her  were smoking mountains this past week hiking, including some hunting mountains up to almost 5000 feet elevation.  Patient has been feeling weak for several weeks. Gemini Meng denies unintentional weight loss, gross blood per stool, or stool color changes.  She is not on blood thinners.  She describes feeling some general malaise and intermittently some nausea abdominal pain.  According to her , he encouraged her to seek medical care but patient did not want to leave for vacation early.  She denies any chest pain, or any fevers or chills.   ____________________________________________________________________  DISCHARGE SUMMARY/HOSPITAL COURSE:  for full details see H&P, daily progress notes, labs, consult notes.      Microcytic anemia  Suspect chronic GI blood loss  Iron deficiency anemia  B12 defficiency   Mild hematuria   -Hemoglobin 3.7 on admission  -FOBT positive  -CT abdomen suggestive of mild chronic inflammatory colitis  -S/p 3 units of PRBC, recheck hemoglobin pending, transfuse PRBC to keep hemoglobin greater than 7  -Continue serial CBC q2 weeks  -Avoid antiplatelets/anticoagulant  -EGD: large hiatal hernia, a possible source of chronic blood loss, though no apparent acute bleeding. irregular z-ine, biopsied to rule out Torres's esophagus  -Colonoscopy: mild, pan-colonic diverticulosis, Internal and external hemorrhoids  -GI recs PPI, high fiber diet, repeat colonoscopy in 5 years, if KIRBY persistent, would plan for capsule endoscopy  -start B12 IV  -on day of DC, pt noted little blood in her urine, she flushed it. HB stable. Pt is smoker. Pt needs Uro f/u for Cystoscopy. CM notified.      Tobacco use  -Quit smoking 2004 restarted about 2 years ago and smokes 1-1.5 packs/day per  the patient states she does not smoke that much  -Clear lungs, normal chest x-ray, normal O2 sats  -Given cessation counseling  -Declines nicotine replacement for now     History of migraines  -Takes amitriptyline and topiramate     Seasonal allergies  -Zyrtec      GI follow up: seen on Sybdvp82/3/21, Dr Carmela Downs. Says that he told her that she does not need repeat CBC every two weeks but to repeat in three months. Does not have specific follow up planned, needs repeat colonoscopy in 5 years. Says she had repeat labs on 11/17/21 which she had done at [facility] lab which is not available in [address] [place] [address] for some reason. Repeat Hgb done then 10.1  Feeling much better. D/C note says +blood in urine on day of discharge, patient has not seen anymore blood since. Will send UC and do UA here     Doing b12 once per week injection, last done 12/2/21. No more refills. Was giving self IM thigh injections. History of vertigo and migraine  Followed by neurology, last seen in January. Taking elavil and topamax daily which controls       Works as .    +smoker  Occasional Etoh  No recreational drugs        Past Medical History:   Diagnosis Date    Anemia NEC     Aneurysm     Delivery normal     x3    Dermatitis     Headache     Headache(784.0)     HX OTHER MEDICAL     [address] [address]     brain anuerysm, 1998    Vertigo      Past Surgical History:   Procedure Laterality Date    COLONOSCOPY N/A 11/3/2021    COLONOSCOPY performed by Shazia Brennan MD at [address] [place] [address]  11/3/2021         HX GYN      cervical biopsy    HX HYSTERECTOMY      HX ORTHOPAEDIC      foot surgery    MS ANEURYSM, INTRACRAN, SIMPLE SURG      UPPER GI ENDOSCOPY,BIOPSY  11/3/2021                Social History     Tobacco Use    Smoking status: Former Smoker     Packs/day: 1.00     Years: 5.00     Pack years: 5.00     Quit date: 3/9/2004     Years since quittin.7    Smokeless tobacco: Never Used   Substance Use Topics    Alcohol use: Yes     Comment: rarely    Drug use: No           Allergies   Allergen Reactions    Aspirin Unknown (comments)     Avoids due to brain aneurysm       Health Maintenance reviewed -declines vaccines at this time. Mammogram ordered today. ROS:  Gen: no fatigue, no fever, no chills, no unexplained weight loss or weight gain  Eyes: no excessive tearing, itching, or discharge  Nose: no rhinorrhea, no sinus pain  Mouth: no oral lesions, no sore throat, no difficulty swallowing  Resp: no shortness of breath, no wheezing, no cough  CV: no chest pain, no orthopnea, no paroxysmal nocturnal dyspnea, no lower extremity edema, no palpitations  Abd: no nausea, no heartburn, no diarrhea, no constipation, no abdominal pain  Neuro: no headaches, no syncope or presyncopal episodes  Endo: no polyuria, no polydipsia.     : no hematuria, no dysuria, no frequency, no incontinence  Heme: no lymphadenopathy, no easy bruising or bleeding, no night sweats  MSK: no joint pain or swelling    PE:  Visit Vitals  /85 (BP 1 Location: Left arm, BP Patient Position: Sitting)   Pulse 97   Temp 98 °F (36.7 °C) (Oral)   Resp 18   Ht 5' 8\" (1.727 m)   Wt 198 lb 9.6 oz (90.1 kg)   SpO2 99%   BMI 30.20 kg/m²     Gen: alert, oriented, no acute distress  Head: normocephalic, atraumatic  Ears: external auditory canals clear, TMs without erythema or effusion  Eyes: pupils equal round reactive to light, sclera clear, conjunctiva clear  Neck: symmetric normal sized thyroid, no carotid bruits, no jugular vein distention  Resp: no increase work of breathing, lungs clear to ausculation bilaterally, no wheezing, rales or rhonchi  CV: S1, S2 normal.  No murmurs, rubs, or gallops. Abd: soft, not tender, not distended. No hepatosplenomegaly. Normal bowel sounds. No hernias. No abdominal or renal bruits. Neuro: cranial nerves intact, normal strength and movement in all extremities, and sensation intact and symmetric. Skin: no lesion or rash  Extremities: no cyanosis or edema    Results for orders placed or performed in visit on 12/07/21   AMB POC URINALYSIS DIP STICK AUTO W/O MICRO   Result Value Ref Range    Color (UA POC) Yellow     Clarity (UA POC) Clear     Glucose (UA POC) Negative Negative    Bilirubin (UA POC) Negative Negative    Ketones (UA POC) Negative Negative    Specific gravity (UA POC) 1.015 1.001 - 1.035    Blood (UA POC) Negative Negative    pH (UA POC) 7.0 4.6 - 8.0    Protein (UA POC) Negative Negative    Urobilinogen (UA POC) 0.2 mg/dL 0.2 - 1    Nitrites (UA POC) Negative Negative    Leukocyte esterase (UA POC) Negative Negative       Assessment/Plan:  Differential diagnosis and treatment options reviewed with patient who is in agreement with treatment plan as outlined below. ICD-10-CM ICD-9-CM    1. Encounter for medical examination to establish care  Z00.00 V70.9    2. Iron deficiency anemia due to chronic blood loss  D50.0 280.0 VITAMIN B12 & FOLATE      FERRITIN      IRON PROFILE      CBC W/O DIFF   3. Hospital discharge follow-up  G27 V62.02 METABOLIC PANEL, COMPREHENSIVE      AMB POC URINALYSIS DIP STICK AUTO W/O MICRO   4. Screening, lipid  Z13.220 V77.91 LIPID PANEL   5. Encounter for hepatitis C screening test for low risk patient  Z11.59 V73.89 HEPATITIS C AB   6. Special screening examination for neoplasm of breast  Z12.39 V76.10 GOMEZ MAMMO BI SCREENING INCL CAD   7. Hematuria, unspecified type  R31.9 599.70 AMB POC URINALYSIS DIP STICK AUTO W/O MICRO      CULTURE, URINE      CULTURE, URINE   8. B12 deficiency  E53.8 266.2 cyanocobalamin (VITAMIN B12) 1,000 mcg/mL injection   9. Smoker  F17.200 305.1    10. Hiatal hernia  K44.9 553.3      Repeat lab order slip given, would like her to have done last week of December which is about 6 weeks after last lab draw. b12 weekly dose given x4, will switch to monthly now. Next dose due 12/27/21. If repeat b12 levels are low then will increase frequency of injections. Mammogram order given. Smoking cessation encouraged. UA looks okay but will send for UC. She will continue PPI for now. Will follow up with GI as needed. Declines vaccines at this time. Discussed BMI and healthy weight. Encouraged patient to work to implement changes including diet high in raw fruits and vegetables, lean protein and good fats. Limit refined, processed carbohydrates and sugar. Encouraged regular exercise. Recommended regular cardiovascular exercise 3-6 times per week for 30-60 minutes daily. I have discussed the diagnosis with the patient and the intended plan as seen in the above orders. The patient has received an after-visit summary and questions were answered concerning future plans. I have discussed medication side effects and warnings with the patient as well. The patient verbalizes understanding and agreement with the plan.

## 2021-12-07 NOTE — PROGRESS NOTES
Chief Complaint   Patient presents with   [facility] New Patient   [facility] Follow Up       1. Have you been to the ER, urgent care clinic since your last visit? Hospitalized since your last visit? Yes When: 11/1/11/4/2021 at [address] for anemia    2. Have you seen or consulted any other health care providers outside of the [address] [place] [address] since your last visit? Include any pap smears or colon screening.  No    Visit Vitals  /85 (BP 1 Location: Left arm, BP Patient Position: Sitting)   Pulse 97   Temp 98 °F (36.7 °C) (Oral)   Resp 18   Ht 5' 8\" (1.727 m)   Wt 198 lb 9.6 oz (90.1 kg)   SpO2 99%   BMI 30.20 kg/m²

## 2021-12-07 NOTE — PATIENT INSTRUCTIONS
Anemia: Care Instructions  Your Care Instructions     Anemia is a low level of red blood cells, which carry oxygen throughout your body. Many things can cause anemia. Lack of iron is one of the most common causes. Your body needs iron to make hemoglobin, a substance in red blood cells that carries oxygen from the lungs to your body's cells. Without enough iron, the body produces fewer and smaller red blood cells. As a result, your body's cells do not get enough oxygen, and you feel tired and weak. And you may have trouble concentrating. Bleeding is the most common cause of a lack of iron. You may have heavy menstrual bleeding or bleeding caused by conditions such as ulcers, hemorrhoids, or cancer. Regular use of aspirin or other anti-inflammatory medicines (such as ibuprofen) also can cause bleeding in some people. A lack of iron in your diet also can cause anemia, especially at times when the body needs more iron, such as during pregnancy, infancy, and the teen years. Your doctor may have prescribed iron pills. It may take several months of treatment for your iron levels to return to normal. Your doctor also may suggest that you eat foods that are rich in iron, such as meat and beans. There are many other causes of anemia. It is not always due to a lack of iron. Finding the specific cause of your anemia will help your doctor find the right treatment for you. Follow-up care is a key part of your treatment and safety. Be sure to make and go to all appointments, and call your doctor if you are having problems. It's also a good idea to know your test results and keep a list of the medicines you take. How can you care for yourself at home? · Take your medicines exactly as prescribed. Call your doctor if you think you are having a problem with your medicine. · If your doctor recommends iron pills, take them as directed:  ? Try to take the pills on an empty stomach about 1 hour before or 2 hours after meals. But you may need to take iron with food to avoid an upset stomach. ? Do not take antacids or drink milk or caffeine drinks (such as coffee, tea, or cola) at the same time or within 2 hours of the time that you take your iron. They can make it hard for your body to absorb the iron. ? Vitamin C (from food or supplements) helps your body absorb iron. Try taking iron pills with a glass of orange juice or some other food that is high in vitamin C, such as citrus fruits. ? Iron pills may cause stomach problems, such as heartburn, nausea, diarrhea, constipation, and cramps. Be sure to drink plenty of fluids, and include fruits, vegetables, and fiber in your diet each day. Iron pills often make your bowel movements dark or green. ? If you forget to take an iron pill, do not take a double dose of iron the next time you take a pill. ? Keep iron pills out of the reach of small children. An overdose of iron can be very dangerous. · Follow your doctor's advice about eating iron-rich foods. These include red meat, shellfish, poultry, eggs, beans, raisins, whole-grain bread, and leafy green vegetables. · Steam vegetables to help them keep their iron content. When should you call for help? Call 911 anytime you think you may need emergency care. For example, call if:    · You have symptoms of a heart attack. These may include:  ? Chest pain or pressure, or a strange feeling in the chest.  ? Sweating. ? Shortness of breath. ? Nausea or vomiting. ? Pain, pressure, or a strange feeling in the back, neck, jaw, or upper belly or in one or both shoulders or arms. ? Lightheadedness or sudden weakness. ? A fast or irregular heartbeat. After you call 911, the  may tell you to chew 1 adult-strength or 2 to 4 low-dose aspirin. Wait for an ambulance. Do not try to drive yourself.     · You passed out (lost consciousness).    Call your doctor now or seek immediate medical care if:    · You have new or increased shortness of breath.     · You are dizzy or lightheaded, or you feel like you may faint.     · Your fatigue and weakness continue or get worse.     · You have any abnormal bleeding, such as:  ? Nosebleeds. ? Vaginal bleeding that is different (heavier, more frequent, at a different time of the month) than what you are used to.  ? Bloody or black stools, or rectal bleeding. ? Bloody or pink urine. Watch closely for changes in your health, and be sure to contact your doctor if:    · You do not get better as expected. Where can you learn more? Go to http://www.hilton.com/  Enter R301 in the search box to learn more about \"Anemia: Care Instructions. \"  Current as of: April 29, 2021               Content Version: 13.0  © 5387-0293 [employer]. Care instructions adapted under license by [employer] (which disclaims liability or warranty for this information). If you have questions about a medical condition or this instruction, always ask your healthcare professional. [place] [address] any warranty or liability for your use of this information.

## 2021-12-09 LAB
BACTERIA SPEC CULT: NORMAL
CC UR VC: NORMAL
SERVICE CMNT-IMP: NORMAL

## 2022-01-05 ENCOUNTER — OFFICE VISIT (OUTPATIENT)
Dept: NEUROLOGY | Age: 52
End: 2022-01-05
Payer: COMMERCIAL

## 2022-01-05 VITALS
HEART RATE: 98 BPM | DIASTOLIC BLOOD PRESSURE: 80 MMHG | OXYGEN SATURATION: 100 % | BODY MASS INDEX: 30.2 KG/M2 | SYSTOLIC BLOOD PRESSURE: 130 MMHG | HEIGHT: 68 IN

## 2022-01-05 DIAGNOSIS — G43.109 MIGRAINE WITH VERTIGO: ICD-10-CM

## 2022-01-05 PROCEDURE — 99213 OFFICE O/P EST LOW 20 MIN: CPT | Performed by: PSYCHIATRY & NEUROLOGY

## 2022-01-05 RX ORDER — TOPIRAMATE 50 MG/1
50 TABLET, FILM COATED ORAL 2 TIMES DAILY
Qty: 180 TABLET | Refills: 3 | Status: SHIPPED | OUTPATIENT
Start: 2022-01-05 | End: 2022-11-03

## 2022-01-05 RX ORDER — AMITRIPTYLINE HYDROCHLORIDE 25 MG/1
25 TABLET, FILM COATED ORAL
Qty: 90 TABLET | Refills: 3 | Status: SHIPPED | OUTPATIENT
Start: 2022-01-05 | End: 2022-11-03

## 2022-01-05 NOTE — PROGRESS NOTES
Chief Complaint   Patient presents with    Follow-up     migraines       HPI    51-year-old woman here to follow-up. I have not seen her since 2019. She remains on amitriptyline and topiramate doing very well. Minimal symptoms breaking through. Main health issue now is undergoing work-up for iron deficiency anemia. Review of Systems   Eyes: Negative for double vision. Neurological: Positive for headaches. Negative for loss of consciousness. All other systems reviewed and are negative. Past Medical History:   Diagnosis Date    Anemia NEC     Aneurysm     Delivery normal     x3    Dermatitis     Headache     Headache(784.0)     HX OTHER MEDICAL     Stroke ([facility] [address]     brain anuerysm, 1998    Vertigo      Family History   Problem Relation Age of Onset    Diabetes Mother     Hypertension Mother     Heart Disease Maternal Aunt      Social History     Socioeconomic History    Marital status:      Spouse name: Not on file    Number of children: Not on file    Years of education: Not on file    Highest education level: Not on file   Occupational History    Not on file   Tobacco Use    Smoking status: Former Smoker     Packs/day: 1.00     Years: 5.00     Pack years: 5.00     Quit date: 3/9/2004     Years since quittin.8    Smokeless tobacco: Never Used   Vaping Use    Vaping Use: Never used   Substance and Sexual Activity    Alcohol use: Yes     Comment: rarely    Drug use: No    Sexual activity: Not Currently     Partners: Male     Birth control/protection: None   Other Topics Concern    Not on file   Social History Narrative    Not on file     Social Determinants of Health     Financial Resource Strain:     Difficulty of Paying Living Expenses: Not on file   Food Insecurity:     Worried About Running Out of Food in the Last Year: Not on file    Irina of Food in the Last Year: Not on file   Transportation Needs:     Lack of Transportation (Medical):  Not on file  Lack of Transportation (Non-Medical): Not on file   Physical Activity:     Days of Exercise per Week: Not on file    Minutes of Exercise per Session: Not on file   Stress:     Feeling of Stress : Not on file   Social Connections:     Frequency of Communication with Friends and Family: Not on file    Frequency of Social Gatherings with Friends and Family: Not on file    Attends [employer] Services: Not on file    Active Member of [address] [place] [address] or Organizations: Not on file    Attends Club or Organization Meetings: Not on file    Marital Status: Not on file   Intimate Partner Violence:     Fear of Current or Ex-Partner: Not on file    Emotionally Abused: Not on file    Physically Abused: Not on file    Sexually Abused: Not on file   Housing Stability:     Unable to Pay for Housing in the Last Year: Not on file    Number of [place] in the Last Year: Not on file    Unstable Housing in the Last Year: Not on file     Allergies   Allergen Reactions    Aspirin Unknown (comments)     Avoids due to brain aneurysm         Current Outpatient Medications   Medication Sig    loratadine (CLARITIN PO) Take  by mouth.  pantoprazole (PROTONIX) 40 mg tablet     cyanocobalamin (VITAMIN B12) 1,000 mcg/mL injection 1 mL by IntraMUSCular route every thirty (30) days.  butalbital-acetaminophen-caffeine (FIORICET, ESGIC) -40 mg per tablet TAKE 1 TABLET BY MOUTH EVERY 6 HOURS AS NEEDED FOR HEADACHE    acetaminophen (TYLENOL EXTRA STRENGTH) 500 mg tablet Take  by mouth every six (6) hours as needed for Pain.  amitriptyline (ELAVIL) 25 mg tablet Take 1 Tablet by mouth nightly.  topiramate (TOPAMAX) 50 mg tablet Take 1 Tablet by mouth two (2) times a day. Indications: migraine prevention    cetirizine (ZYRTEC) 10 mg tablet Take 10 mg by mouth daily. Indications: SEASONAL ALLERGIC RHINITIS (Patient not taking: Reported on 1/5/2022)     No current facility-administered medications for this visit. Neurologic Exam     Mental Status   WD/WN adult in NAD, normal grooming  VSS  A&O x 3    PERRL, nonicteric  Face is symmetric, tongue midline  Speech is fluent and clear  No limb ataxia. No abnl movements. Moving all extemities spontaneously and symmetric  Normal gait    CVS RRR  Lungs nonlabored  Skin is warm and dry         Visit Vitals  /80   Pulse 98   Ht 5' 8\" (1.727 m)   SpO2 100%   BMI 30.20 kg/m²       Assessment and Plan   Diagnoses and all orders for this visit:    1. Migraine with vertigo  -     amitriptyline (ELAVIL) 25 mg tablet; Take 1 Tablet by mouth nightly. -     topiramate (TOPAMAX) 50 mg tablet; Take 1 Tablet by mouth two (2) times a day. Indications: migraine prevention           51-year-old woman with migraine variant with vertigo with good control clinically utilizing amitriptyline and topiramate. No changes to her medication therapy since it is effective. Fioricet as needed. I will see her annually or sooner if needed. 20 minutes of time was spent in total reviewing the medical record, face-to-face time, and time completing documentation today. I reviewed and decided to continue the current medications.       Dorcas Medici, [address]  Diplomate ABPN

## 2022-01-12 ENCOUNTER — OFFICE VISIT (OUTPATIENT)
Dept: ONCOLOGY | Age: 52
End: 2022-01-12
Payer: COMMERCIAL

## 2022-01-12 VITALS
DIASTOLIC BLOOD PRESSURE: 77 MMHG | HEIGHT: 68 IN | BODY MASS INDEX: 30.01 KG/M2 | WEIGHT: 198 LBS | HEART RATE: 105 BPM | OXYGEN SATURATION: 98 % | SYSTOLIC BLOOD PRESSURE: 135 MMHG | TEMPERATURE: 98 F

## 2022-01-12 DIAGNOSIS — E53.8 B12 DEFICIENCY: ICD-10-CM

## 2022-01-12 DIAGNOSIS — E53.8 FOLATE DEFICIENCY: ICD-10-CM

## 2022-01-12 DIAGNOSIS — D64.9 SEVERE ANEMIA: Primary | ICD-10-CM

## 2022-01-12 PROCEDURE — 99204 OFFICE O/P NEW MOD 45 MIN: CPT | Performed by: STUDENT IN AN ORGANIZED HEALTH CARE EDUCATION/TRAINING PROGRAM

## 2022-01-12 RX ORDER — LOPERAMIDE HCL 2 MG
1000 TABLET ORAL DAILY
Qty: 30 TABLET | Refills: 6 | Status: SHIPPED | OUTPATIENT
Start: 2022-01-12

## 2022-01-12 RX ORDER — LANOLIN ALCOHOL/MO/W.PET/CERES
325 CREAM (GRAM) TOPICAL
Qty: 30 TABLET | Refills: 6 | Status: SHIPPED | OUTPATIENT
Start: 2022-01-12 | End: 2022-06-28 | Stop reason: SDUPTHER

## 2022-01-12 RX ORDER — FOLIC ACID 1 MG/1
1 TABLET ORAL DAILY
Qty: 30 TABLET | Refills: 6 | Status: SHIPPED | OUTPATIENT
Start: 2022-01-12 | End: 2022-08-09 | Stop reason: SDUPTHER

## 2022-01-12 NOTE — PROGRESS NOTES
Rosalina Valentine is a 46 y.o. female here for new patient appt for KIRBY. Pt had EGD and colonoscopy performed on 11/3/21. Pt states she feels ok now. 1. Have you been to the ER, urgent care clinic since your last visit? Hospitalized since your last visit? New Pt    2. Have you seen or consulted any other health care providers outside of the [address] [place] [address] since your last visit? Include any pap smears or colon screening.  New Pt

## 2022-01-18 NOTE — PROGRESS NOTES
[facility] [place] at [address]  W: [phone] F: [phone]      Reason for Visit:   Gen Bucio is a 46 y.o. female who is seen in consultation at the request of Teresa Cardoza NP for evaluation of microcytic anemia. Hematology / Oncology Treatment History:     Hematological/Oncological Diagnosis: Microcytic anemia    Date of Diagnosis: 11/1/2021    Treatment course: IV B12, s/p 3U PRBC as inpatient. History of Present Illness:   46year old found to have severe anemia with FOBT positive stools in November 2021. Presenting Hg was 3.7 g/dl. Workup with colonoscopy showed hemorrhoids, endoscopy showed a large hiatal hernia without acute bleeding. Labs showed B12 deficiency, iron deficiency. The patient started oral iron supplementation but has had no improvement with most recent iron saturation of 5%, ferritin of 3. Repeat B12 in late December shows low normal B12 at 354, but low folate at 4.8. The patient has symptoms of fatigue but denies any dizziness or neuropathy. Most recent cbc shows Hg of 10.7 with mcv of only 77. No other cytopenias. She denies any unintentional weight loss, night sweats, appetite changes or other symptoms that would be concerning for malignancy. Medication review shows that the patient is taking Protonix. Family history reviewed, non contributory. Social history reviewed, also non contributory. Review of Systems: A complete review of systems was obtained, negative except as described above.     Past Medical History:   Diagnosis Date    Anemia NEC     Aneurysm     Delivery normal     x3    Dermatitis     Headache     Headache(784.0)     HX OTHER MEDICAL     Stroke ([facility] [address]     brain anuerysm, 1998    Vertigo       Past Surgical History:   Procedure Laterality Date    COLONOSCOPY N/A 11/3/2021    COLONOSCOPY performed by Rajat Shaw MD [facility] at [address] [place] [address]  11/3/2021         HX GYN      cervical biopsy    HX HYSTERECTOMY      HX ORTHOPAEDIC      foot surgery    AL ANEURYSM, INTRACRAN, SIMPLE SURG      UPPER GI ENDOSCOPY,BIOPSY  11/3/2021           Social History     Tobacco Use    Smoking status: Former Smoker     Packs/day: 1.00     Years: 5.00     Pack years: 5.00     Quit date: 3/9/2004     Years since quittin.8    Smokeless tobacco: Never Used   Substance Use Topics    Alcohol use: Yes     Comment: rarely      Family History   Problem Relation Age of Onset    Diabetes Mother     Hypertension Mother     Heart Disease Maternal Aunt      Current Outpatient Medications   Medication Sig    ZINC PO Take  by mouth.  COQ10, LIPOSOMAL UBIQUINOL, PO Take  by mouth.  cholecalciferol, vitamin D3, (VITAMIN D3 PO) Take  by mouth.  folic acid (FOLVITE) 1 mg tablet Take 1 Tablet by mouth daily.  ferrous sulfate 325 mg (65 mg iron) tablet Take 1 Tablet by mouth Daily (before breakfast).  cyanocobalamin ER 1,000 mcg tablet Take 1 Tablet by mouth daily.  loratadine (CLARITIN PO) Take  by mouth.  amitriptyline (ELAVIL) 25 mg tablet Take 1 Tablet by mouth nightly.  topiramate (TOPAMAX) 50 mg tablet Take 1 Tablet by mouth two (2) times a day. Indications: migraine prevention    pantoprazole (PROTONIX) 40 mg tablet     cyanocobalamin (VITAMIN B12) 1,000 mcg/mL injection 1 mL by IntraMUSCular route every thirty (30) days.  butalbital-acetaminophen-caffeine (FIORICET, ESGIC) -40 mg per tablet TAKE 1 TABLET BY MOUTH EVERY 6 HOURS AS NEEDED FOR HEADACHE    acetaminophen (TYLENOL EXTRA STRENGTH) 500 mg tablet Take  by mouth every six (6) hours as needed for Pain.  cetirizine (ZYRTEC) 10 mg tablet Take 10 mg by mouth daily. Indications: SEASONAL ALLERGIC RHINITIS (Patient not taking: Reported on 2022)     No current facility-administered medications for this visit.       Allergies Allergen Reactions    Aspirin Unknown (comments)     Avoids due to brain aneurysm            Physical Exam:     Visit Vitals  /77 (BP 1 Location: Left upper arm, BP Patient Position: Sitting)   Pulse (!) 105   Temp 98 °F (36.7 °C) (Temporal)   Ht 5' 8\" (1.727 m)   Wt 198 lb (89.8 kg)   SpO2 98%   BMI 30.11 kg/m²     ECOG PS: 1  General: No distress  Eyes: PERRLA, anicteric sclerae  HENT: Atraumatic with normal appearance of ears and nose; OP clear  Neck: Supple; no visualized JVD  Lymphatic: No cervical, or supraclavicular lymphadenopathy. Respiratory: CTAB, normal respiratory effort  CV: Normal rate, regular rhythm, no murmurs, no peripheral edema  GI: Soft, nontender, nondistended, no palpable masses, no hepatomegaly, no splenomegaly  MS: Normal gait and station. Digits without clubbing or cyanosis. Skin: No rashes, ecchymoses, or petechiae. Normal temperature, turgor, and texture. Neuro/Psych: Alert, oriented, appropriate affect, normal judgment/insight      Results:     Lab Results   Component Value Date/Time    WBC 7.5 12/29/2021 07:45 AM    HGB 10.7 (L) 12/29/2021 07:45 AM    HCT 36.4 12/29/2021 07:45 AM    PLATELET 866 57/46/0113 07:45 AM    MCV 77.0 (L) 12/29/2021 07:45 AM    ABS. NEUTROPHILS 5.2 11/04/2021 01:36 AM     Lab Results   Component Value Date/Time    Sodium 136 12/29/2021 07:45 AM    Potassium 4.0 12/29/2021 07:45 AM    Chloride 108 12/29/2021 07:45 AM    CO2 20 (L) 12/29/2021 07:45 AM    Glucose 154 (H) 12/29/2021 07:45 AM    BUN 11 12/29/2021 07:45 AM    Creatinine 0.72 12/29/2021 07:45 AM    GFR est AA >60 12/29/2021 07:45 AM    GFR est non-AA >60 12/29/2021 07:45 AM    Calcium 9.5 12/29/2021 07:45 AM     Lab Results   Component Value Date/Time    Bilirubin, total 0.2 12/29/2021 07:45 AM    ALT (SGPT) 34 12/29/2021 07:45 AM    Alk.  phosphatase 119 (H) 12/29/2021 07:45 AM    Protein, total 7.8 12/29/2021 07:45 AM    Albumin 3.6 12/29/2021 07:45 AM    Globulin 4.2 (H) 12/29/2021 07:45 AM       Assessment and Recommendations:     46year old with microcytic anemia, likely from iron deficiency. Unclear cause of iron deficiency. - Patient had profound anemia on initial presentation with Hg of 3.1 g/dl. - last iron studies show ferritin of only 3, without improvement with oral iron. - patient also has B12 and folate deficiencies. - colonoscopy shows pan colonic diverticulosis and internal plus external hemorrhoids  - endoscopy shows large hiatal hernia  - no clear sources of bleeding.   - plan for repeat labs, patient asked to trial oral iron supplementation, B12 and folate.   -will recheck labs in  3 weeks and see if IV iron is required.        Signed By: Phoebe Reece MD      Attending Medical Oncologist   [facility]

## 2022-02-03 ENCOUNTER — VIRTUAL VISIT (OUTPATIENT)
Dept: ONCOLOGY | Age: 52
End: 2022-02-03
Payer: COMMERCIAL

## 2022-02-03 DIAGNOSIS — D64.9 NORMOCYTIC ANEMIA: ICD-10-CM

## 2022-02-03 DIAGNOSIS — E53.8 B12 DEFICIENCY: Primary | ICD-10-CM

## 2022-02-03 DIAGNOSIS — E53.8 FOLATE DEFICIENCY: ICD-10-CM

## 2022-02-03 PROCEDURE — 99214 OFFICE O/P EST MOD 30 MIN: CPT | Performed by: STUDENT IN AN ORGANIZED HEALTH CARE EDUCATION/TRAINING PROGRAM

## 2022-02-03 RX ORDER — OMEPRAZOLE 40 MG/1
40 CAPSULE, DELAYED RELEASE ORAL DAILY
COMMUNITY
Start: 2022-01-27

## 2022-02-03 NOTE — PROGRESS NOTES
Chief Complaint   Patient presents with    Follow-up     3w Hema f/u       1. Have you been to the ER, urgent care clinic since your last visit? Hospitalized since your last visit? No    2. Have you seen or consulted any other health care providers outside of the [address] [place] [address] since your last visit? Include any pap smears or colon screening.  No

## 2022-02-07 ENCOUNTER — TELEPHONE (OUTPATIENT)
Dept: ONCOLOGY | Age: 52
End: 2022-02-07

## 2022-02-14 ENCOUNTER — HOSPITAL ENCOUNTER (OUTPATIENT)
Dept: MAMMOGRAPHY | Age: 52
Discharge: HOME | End: 2022-02-14
Attending: NURSE PRACTITIONER
Payer: COMMERCIAL

## 2022-02-14 DIAGNOSIS — Z12.39 SPECIAL SCREENING EXAMINATION FOR NEOPLASM OF BREAST: ICD-10-CM

## 2022-02-14 PROCEDURE — 77067 SCR MAMMO BI INCL CAD: CPT

## 2022-03-18 PROBLEM — F17.200 SMOKER: Status: ACTIVE | Noted: 2021-12-07

## 2022-03-19 PROBLEM — K44.9 HIATAL HERNIA: Status: ACTIVE | Noted: 2021-12-07

## 2022-03-19 PROBLEM — E53.8 B12 DEFICIENCY: Status: ACTIVE | Noted: 2021-12-07

## 2022-03-19 PROBLEM — D64.9 SEVERE ANEMIA: Status: ACTIVE | Noted: 2021-11-01

## 2022-04-27 ENCOUNTER — TELEPHONE (OUTPATIENT)
Dept: ONCOLOGY | Age: 52
End: 2022-04-27

## 2022-04-27 DIAGNOSIS — D64.9 NORMOCYTIC ANEMIA: ICD-10-CM

## 2022-04-27 DIAGNOSIS — E53.8 B12 DEFICIENCY: Primary | ICD-10-CM

## 2022-04-27 NOTE — TELEPHONE ENCOUNTER
Lab center unable to obtain pt's labs. New orders needed to be entered. Entering now. They will contact pt to reschedule but I am unsure when. Her appt with us is 5/3. VORB FROM DR Serina Wright CBC WITH DIFF IRON PROFILE FERRITIN VITAMIN B12 CMP.

## 2022-04-29 LAB
ALBUMIN SERPL-MCNC: 4.1 G/DL (ref 3.5–5)
ALBUMIN/GLOB SERPL: 1.1 {RATIO} (ref 1.1–2.2)
ALP SERPL-CCNC: 142 U/L (ref 45–117)
ALT SERPL-CCNC: 26 U/L (ref 12–78)
ANION GAP SERPL CALC-SCNC: 7 MMOL/L (ref 5–15)
AST SERPL-CCNC: 8 U/L (ref 15–37)
BASOPHILS # BLD: 0 K/UL (ref 0–0.1)
BASOPHILS NFR BLD: 0 % (ref 0–1)
BILIRUB SERPL-MCNC: 0.2 MG/DL (ref 0.2–1)
BUN SERPL-MCNC: 4 MG/DL (ref 6–20)
BUN/CREAT SERPL: 7 (ref 12–20)
CALCIUM SERPL-MCNC: 9.5 MG/DL (ref 8.5–10.1)
CHLORIDE SERPL-SCNC: 108 MMOL/L (ref 97–108)
CO2 SERPL-SCNC: 25 MMOL/L (ref 21–32)
CREAT SERPL-MCNC: 0.61 MG/DL (ref 0.55–1.02)
DIFFERENTIAL METHOD BLD: ABNORMAL
EOSINOPHIL # BLD: 0.2 K/UL (ref 0–0.4)
EOSINOPHIL NFR BLD: 2 % (ref 0–7)
ERYTHROCYTE [DISTWIDTH] IN BLOOD BY AUTOMATED COUNT: 15.9 % (ref 11.5–14.5)
FERRITIN SERPL-MCNC: 22 NG/ML (ref 26–388)
GLOBULIN SER CALC-MCNC: 3.9 G/DL (ref 2–4)
GLUCOSE SERPL-MCNC: 111 MG/DL (ref 65–100)
HCT VFR BLD AUTO: 48 % (ref 35–47)
HGB BLD-MCNC: 15 G/DL (ref 11.5–16)
IMM GRANULOCYTES # BLD AUTO: 0 K/UL (ref 0–0.04)
IMM GRANULOCYTES NFR BLD AUTO: 0 % (ref 0–0.5)
IRON SATN MFR SERPL: 11 % (ref 20–50)
IRON SERPL-MCNC: 39 UG/DL (ref 35–150)
LYMPHOCYTES # BLD: 2.5 K/UL (ref 0.8–3.5)
LYMPHOCYTES NFR BLD: 29 % (ref 12–49)
MCH RBC QN AUTO: 29.2 PG (ref 26–34)
MCHC RBC AUTO-ENTMCNC: 31.3 G/DL (ref 30–36.5)
MCV RBC AUTO: 93.4 FL (ref 80–99)
MONOCYTES # BLD: 0.4 K/UL (ref 0–1)
MONOCYTES NFR BLD: 5 % (ref 5–13)
NEUTS SEG # BLD: 5.6 K/UL (ref 1.8–8)
NEUTS SEG NFR BLD: 64 % (ref 32–75)
NRBC # BLD: 0 K/UL (ref 0–0.01)
NRBC BLD-RTO: 0 PER 100 WBC
PLATELET # BLD AUTO: 201 K/UL (ref 150–400)
PMV BLD AUTO: 12.7 FL (ref 8.9–12.9)
POTASSIUM SERPL-SCNC: 4.4 MMOL/L (ref 3.5–5.1)
PROT SERPL-MCNC: 8 G/DL (ref 6.4–8.2)
RBC # BLD AUTO: 5.14 M/UL (ref 3.8–5.2)
SODIUM SERPL-SCNC: 140 MMOL/L (ref 136–145)
TIBC SERPL-MCNC: 359 UG/DL (ref 250–450)
VIT B12 SERPL-MCNC: 1847 PG/ML (ref 193–986)
WBC # BLD AUTO: 8.9 K/UL (ref 3.6–11)

## 2022-05-03 ENCOUNTER — VIRTUAL VISIT (OUTPATIENT)
Dept: ONCOLOGY | Age: 52
End: 2022-05-03
Payer: COMMERCIAL

## 2022-05-03 DIAGNOSIS — E53.8 B12 DEFICIENCY: ICD-10-CM

## 2022-05-03 DIAGNOSIS — E61.1 IRON DEFICIENCY: Primary | ICD-10-CM

## 2022-05-03 PROCEDURE — 99213 OFFICE O/P EST LOW 20 MIN: CPT | Performed by: STUDENT IN AN ORGANIZED HEALTH CARE EDUCATION/TRAINING PROGRAM

## 2022-05-03 NOTE — PROGRESS NOTES
[facility] [place] at [address] [facility] [address]  W: [phone] F: [phone]      Reason for Visit:   Carol Frye is a 46 y.o. female who is seen in consultation at the request of Porfirio Aranda NP for evaluation of microcytic anemia. Hematology / Oncology Treatment History:     Hematological/Oncological Diagnosis: Microcytic anemia    Date of Diagnosis: 11/1/2021    Treatment course: IV B12, s/p 3U PRBC as inpatient. History of Present Illness:   46year old found to have severe anemia with FOBT positive stools in November 2021. Presenting Hg was 3.7 g/dl. Workup with colonoscopy showed hemorrhoids, endoscopy showed a large hiatal hernia without acute bleeding. Labs showed B12 deficiency, iron deficiency. The patient started oral iron supplementation but has had no improvement with most recent iron saturation of 5%, ferritin of 3. Repeat B12 in late December shows low normal B12 at 354, but low folate at 4.8. The patient has symptoms of fatigue but denies any dizziness or neuropathy. Most recent cbc shows Hg of 10.7 with mcv of only 77. No other cytopenias. She denies any unintentional weight loss, night sweats, appetite changes or other symptoms that would be concerning for malignancy. Medication review shows that the patient is taking Protonix. Family history reviewed, non contributory. Social history reviewed, also non contributory. 2/3/22: Patient doing well, since last encounter, the patient has been on oral iron supplementation with ferrous sulfate. While she has some constipation, she has been compliant for now. Iron saturation has improved to 31%, hemoglobin has improved to 12.2 g/dL. Overall, the patient is improved. L31 and folic acid levels are normal.  The patient reports that her clinical symptoms are improving.     Interval History:       Carol Frye, who was evaluated through a synchronous (real-time) audio-video encounter, and/or her healthcare decision maker, is aware that it is a billable service, which includes applicable co-pays, with coverage as determined by her insurance carrier. She provided verbal consent to proceed and patient identification was verified. This visit was conducted pursuant to the emergency declaration under the [address], [facility] authority and the [employer] General Act. A caregiver was present when appropriate. Ability to conduct physical exam was limited. The patient was located at home in a state where the provider was licensed to provide care. --Ashley Smith MD on 5/3/2022 at 3:24 PM    Patient in good spirits. No clinical symptoms of anemia or iron deficiency. Labs reviewed with patient. No current complaints.      Past Medical History:   Diagnosis Date    Anemia NEC     Aneurysm     Delivery normal     x3    Dermatitis     Headache     Headache(784.0)     HX OTHER MEDICAL     Stroke ([facility] [address]     brain anuerysm,     Vertigo       Past Surgical History:   Procedure Laterality Date    COLONOSCOPY N/A 11/3/2021    COLONOSCOPY performed by Kevin Acosta MD at [address] [place] [address]  11/3/2021         HX GYN      cervical biopsy    HX HYSTERECTOMY      HX ORTHOPAEDIC      foot surgery    NE ANEURYSM, INTRACRAN, SIMPLE SURG      UPPER GI ENDOSCOPY,BIOPSY  11/3/2021           Social History     Tobacco Use    Smoking status: Former Smoker     Packs/day: 1.00     Years: 5.00     Pack years: 5.00     Quit date: 3/9/2004     Years since quittin.1    Smokeless tobacco: Never Used   Substance Use Topics    Alcohol use: Yes     Comment: rarely      Family History   Problem Relation Age of Onset    Diabetes Mother     Hypertension Mother     Heart Disease Maternal Aunt      Current Outpatient Medications Medication Sig    omeprazole (PRILOSEC) 40 mg capsule Take 40 mg by mouth daily.  ZINC PO Take  by mouth.  COQ10, LIPOSOMAL UBIQUINOL, PO Take  by mouth.  cholecalciferol, vitamin D3, (VITAMIN D3 PO) Take  by mouth.  folic acid (FOLVITE) 1 mg tablet Take 1 Tablet by mouth daily.  ferrous sulfate 325 mg (65 mg iron) tablet Take 1 Tablet by mouth Daily (before breakfast).  cyanocobalamin ER 1,000 mcg tablet Take 1 Tablet by mouth daily.  loratadine (CLARITIN PO) Take  by mouth.  amitriptyline (ELAVIL) 25 mg tablet Take 1 Tablet by mouth nightly.  topiramate (TOPAMAX) 50 mg tablet Take 1 Tablet by mouth two (2) times a day. Indications: migraine prevention    cyanocobalamin (VITAMIN B12) 1,000 mcg/mL injection 1 mL by IntraMUSCular route every thirty (30) days.  butalbital-acetaminophen-caffeine (FIORICET, ESGIC) -40 mg per tablet TAKE 1 TABLET BY MOUTH EVERY 6 HOURS AS NEEDED FOR HEADACHE    acetaminophen (TYLENOL EXTRA STRENGTH) 500 mg tablet Take  by mouth every six (6) hours as needed for Pain.  pantoprazole (PROTONIX) 40 mg tablet  (Patient not taking: Reported on 2/3/2022)    cetirizine (ZYRTEC) 10 mg tablet Take 10 mg by mouth daily. Indications: SEASONAL ALLERGIC RHINITIS (Patient not taking: Reported on 1/5/2022)     No current facility-administered medications for this visit. Allergies   Allergen Reactions    Aspirin Unknown (comments)     Avoids due to brain aneurysm            Physical Exam:     There were no vitals taken for this visit.   ECOG PS: 0  General: alert, cooperative, no distress   Mental  status: normal mood, behavior, speech, dress, motor activity, and thought processes, able to follow commands   HENT: NCAT   Neck: no visualized mass   Resp: no respiratory distress   Neuro: no gross deficits   Skin: no discoloration or lesions of concern on visible areas   Psychiatric: normal affect, consistent with stated mood, no evidence of hallucinations Results:     Lab Results   Component Value Date/Time    WBC 8.9 04/28/2022 01:35 PM    HGB 15.0 04/28/2022 01:35 PM    HCT 48.0 (H) 04/28/2022 01:35 PM    PLATELET 897 75/60/5920 01:35 PM    MCV 93.4 04/28/2022 01:35 PM    ABS. NEUTROPHILS 5.6 04/28/2022 01:35 PM     Lab Results   Component Value Date/Time    Sodium 140 04/28/2022 01:35 PM    Potassium 4.4 04/28/2022 01:35 PM    Chloride 108 04/28/2022 01:35 PM    CO2 25 04/28/2022 01:35 PM    Glucose 111 (H) 04/28/2022 01:35 PM    BUN 4 (L) 04/28/2022 01:35 PM    Creatinine 0.61 04/28/2022 01:35 PM    GFR est AA >60 04/28/2022 01:35 PM    GFR est non-AA >60 04/28/2022 01:35 PM    Calcium 9.5 04/28/2022 01:35 PM     Lab Results   Component Value Date/Time    Bilirubin, total 0.2 04/28/2022 01:35 PM    ALT (SGPT) 26 04/28/2022 01:35 PM    Alk. phosphatase 142 (H) 04/28/2022 01:35 PM    Protein, total 8.0 04/28/2022 01:35 PM    Albumin 4.1 04/28/2022 01:35 PM    Globulin 3.9 04/28/2022 01:35 PM       Assessment and Recommendations:     46year old with microcytic anemia, likely from iron deficiency. Unclear cause of iron deficiency. - Patient had profound anemia on initial presentation with Hg of 3.1 g/dl. - initial iron studies show ferritin of only 3  -Completed trial of oral iron supplementation, with marked improvement in iron stores and hemoglobin  - patient also has B12 and folate deficiencies. These have now been corrected with B12 and folate supplementation  - colonoscopy shows pan colonic diverticulosis and internal plus external hemorrhoids  - endoscopy shows large hiatal hernia  - no clear sources of bleeding.     - Repeat labs from 4/28/22 show sustained improvement in Hg to 15.0, normal WBC at 8.9, PLT at 201. Iron studies shows iron saturation of only 11%, with ferritin of only 22. B12 is normal/high. Patient is clinically without symtpoms.      - If patient becomes anemic with Hg <10, she will require capsule study.      - Patient advised to call with any new or worsening symptoms  - plan for follow up in 6 months with repeat CBC, iron studies.          Signed By: Scott Barakat MD      Attending Medical Oncologist   [facility]

## 2022-05-03 NOTE — PROGRESS NOTES
Debi Guzman is a 46 y.o. female  Here for 3 month follow up for KIRBY. Here to discuss recent labs. No concerns brought up. 1. Have you been to the ER, urgent care clinic since your last visit? Hospitalized since your last visit? no    2. Have you seen or consulted any other health care providers outside of the [address] [place] [address] since your last visit? Include any pap smears or colon screening.   no

## 2022-06-28 DIAGNOSIS — E53.8 B12 DEFICIENCY: ICD-10-CM

## 2022-06-28 DIAGNOSIS — E53.8 FOLATE DEFICIENCY: ICD-10-CM

## 2022-06-28 DIAGNOSIS — D64.9 SEVERE ANEMIA: ICD-10-CM

## 2022-06-28 RX ORDER — LANOLIN ALCOHOL/MO/W.PET/CERES
325 CREAM (GRAM) TOPICAL
Qty: 30 TABLET | Refills: 6 | Status: SHIPPED | OUTPATIENT
Start: 2022-06-28

## 2022-06-28 NOTE — PROGRESS NOTES
PER VADIM FROM WONG COLEY TO CLARA DREW RN FOR THE BELOW REFILL REQUEST:    Requested Prescriptions     Pending Prescriptions Disp Refills    ferrous sulfate 325 mg (65 mg iron) tablet 30 Tablet 6     Sig: Take 1 Tablet by mouth Daily (before breakfast).

## 2022-08-08 DIAGNOSIS — D64.9 SEVERE ANEMIA: ICD-10-CM

## 2022-08-08 DIAGNOSIS — E53.8 B12 DEFICIENCY: ICD-10-CM

## 2022-08-08 DIAGNOSIS — E53.8 FOLATE DEFICIENCY: ICD-10-CM

## 2022-08-08 RX ORDER — FOLIC ACID 1 MG/1
1 TABLET ORAL DAILY
Qty: 30 TABLET | Refills: 6 | Status: CANCELLED | OUTPATIENT
Start: 2022-08-08

## 2022-08-08 NOTE — PROGRESS NOTES
PER VADIM FROM WONG COLEY TO CLARA DREW RN FOR THE BELOW REFILL REQUEST:    Requested Prescriptions     Pending Prescriptions Disp Refills    folic acid (FOLVITE) 1 mg tablet 30 Tablet 6     Sig: Take 1 Tablet by mouth in the morning.

## 2022-08-09 DIAGNOSIS — E53.8 FOLATE DEFICIENCY: ICD-10-CM

## 2022-08-09 DIAGNOSIS — E53.8 B12 DEFICIENCY: ICD-10-CM

## 2022-08-09 DIAGNOSIS — D64.9 SEVERE ANEMIA: ICD-10-CM

## 2022-08-09 RX ORDER — FOLIC ACID 1 MG/1
1 TABLET ORAL DAILY
Qty: 30 TABLET | Refills: 6 | Status: SHIPPED | OUTPATIENT
Start: 2022-08-09

## 2022-11-02 ENCOUNTER — VIRTUAL VISIT (OUTPATIENT)
Dept: ONCOLOGY | Age: 52
End: 2022-11-02
Payer: COMMERCIAL

## 2022-11-02 DIAGNOSIS — D64.9 NORMOCYTIC ANEMIA: ICD-10-CM

## 2022-11-02 DIAGNOSIS — E53.8 FOLATE DEFICIENCY: ICD-10-CM

## 2022-11-02 DIAGNOSIS — E53.8 B12 DEFICIENCY: ICD-10-CM

## 2022-11-02 DIAGNOSIS — E61.1 IRON DEFICIENCY: Primary | ICD-10-CM

## 2022-11-02 PROCEDURE — 99213 OFFICE O/P EST LOW 20 MIN: CPT | Performed by: STUDENT IN AN ORGANIZED HEALTH CARE EDUCATION/TRAINING PROGRAM

## 2022-11-02 RX ORDER — ESTRADIOL 0.5 MG/1
TABLET ORAL
COMMUNITY
Start: 2022-06-17

## 2022-11-02 RX ORDER — AA/PROT/LYSINE/METHIO/VIT C/B6 50-12.5 MG
TABLET ORAL
COMMUNITY

## 2022-11-02 RX ORDER — CALCIUM CARB/VITAMIN D3/VIT K1 500-500-40
TABLET,CHEWABLE ORAL
COMMUNITY

## 2022-11-02 RX ORDER — LANOLIN ALCOHOL/MO/W.PET/CERES
CREAM (GRAM) TOPICAL
COMMUNITY

## 2022-11-02 RX ORDER — LORATADINE 10 MG
TABLET,CHEWABLE ORAL
COMMUNITY

## 2022-11-02 RX ORDER — ZINC/VIT C/PYRIDOXINE (VIT B6) 12-60-0.5
LOZENGE ORAL
COMMUNITY

## 2022-11-02 NOTE — PROGRESS NOTES
[facility] [place] at [address] [facility] [address], [address]  W: [phone] F: [phone]      Reason for Visit:   Yolanda Arvizu is a 46 y.o. female who is seen in consultation at the request of Silvia Foley NP for evaluation of microcytic anemia. Hematology / Oncology Treatment History:     Hematological/Oncological Diagnosis: Microcytic anemia    Date of Diagnosis: 11/1/2021    Treatment course: IV B12, s/p 3U PRBC as inpatient. History of Present Illness:   46year old found to have severe anemia with FOBT positive stools in November 2021. Presenting Hg was 3.7 g/dl. Workup with colonoscopy showed hemorrhoids, endoscopy showed a large hiatal hernia without acute bleeding. Labs showed B12 deficiency, iron deficiency. The patient started oral iron supplementation but has had no improvement with most recent iron saturation of 5%, ferritin of 3. Repeat B12 in late December shows low normal B12 at 354, but low folate at 4.8. The patient has symptoms of fatigue but denies any dizziness or neuropathy. Most recent cbc shows Hg of 10.7 with mcv of only 77. No other cytopenias. She denies any unintentional weight loss, night sweats, appetite changes or other symptoms that would be concerning for malignancy. Medication review shows that the patient is taking Protonix. Family history reviewed, non contributory. Social history reviewed, also non contributory. 2/3/22: Patient doing well, since last encounter, the patient has been on oral iron supplementation with ferrous sulfate. While she has some constipation, she has been compliant for now. Iron saturation has improved to 31%, hemoglobin has improved to 12.2 g/dL. Overall, the patient is improved. C12 and folic acid levels are normal.  The patient reports that her clinical symptoms are improving.     Interval History:       Yolanda Arvizu, who was evaluated through a synchronous (real-time) audio-video encounter, and/or her healthcare decision maker, is aware that it is a billable service, which includes applicable co-pays, with coverage as determined by her insurance carrier. She provided verbal consent to proceed and patient identification was verified. This visit was conducted pursuant to the emergency declaration under the [address], [facility] authority and the [employer] General Act. A caregiver was present when appropriate. Ability to conduct physical exam was limited. The patient was located at home in a state where the provider was licensed to provide care. --Blue Hawkins MD on 2022 at 3:24 PM    Patient in good spirits. No clinical symptoms of anemia or iron deficiency. Labs reviewed with patient. No current complaints.      Past Medical History:   Diagnosis Date    Anemia NEC     Aneurysm     Delivery normal     x3    Dermatitis     Headache     Headache(784.0)     HX OTHER MEDICAL     Stroke [facility])     brain anuerysm,     Vertigo       Past Surgical History:   Procedure Laterality Date    COLONOSCOPY N/A 11/3/2021    COLONOSCOPY performed by Neelima Benites MD at [facility] ENDOSCOPY    COLONOSCOPY,SILVERIO PENALOZA,SNARE  11/3/2021         HX GYN      cervical biopsy    HX HYSTERECTOMY      HX ORTHOPAEDIC      foot surgery    GA ANEURYSM, INTRACRAN, SIMPLE SURG      UPPER GI ENDOSCOPY,BIOPSY  11/3/2021           Social History     Tobacco Use    Smoking status: Former     Packs/day: 1.00     Years: 5.00     Pack years: 5.00     Types: Cigarettes     Quit date: 3/9/2004     Years since quittin.6    Smokeless tobacco: Never   Substance Use Topics    Alcohol use: Yes     Comment: rarely      Family History   Problem Relation Age of Onset    Diabetes Mother     Hypertension Mother     Heart Disease Maternal Aunt      Current Outpatient Medications   Medication Sig    estradioL (ESTRACE) 0.5 mg tablet estradiol 0.5 mg tablet   TAKE 1 TABLET BY MOUTH EVERY DAY28    loratadine (Claritin) 10 mg chew Claritin    coenzyme q10 (Co Q-10) 10 mg cap Co Q-10    CYANOCOBALAMIN, VITAMIN B-12, cyanocobalamin (vitamin G-64)    folic acid 884 mcg tablet folic acid    cholecalciferol, vitamin d3, (Vitamin D3) 10 mcg (400 unit) cap Vitamin D3    Zinc-Ascorbic Acid-Pyridoxine 12-60-0.5 mg lozg zinc    folic acid (FOLVITE) 1 mg tablet Take 1 Tablet by mouth in the morning. ferrous sulfate 325 mg (65 mg iron) tablet Take 1 Tablet by mouth Daily (before breakfast). omeprazole (PRILOSEC) 40 mg capsule Take 40 mg by mouth daily. ZINC PO Take  by mouth. COQ10, LIPOSOMAL UBIQUINOL, PO Take  by mouth. cholecalciferol, vitamin D3, (VITAMIN D3 PO) Take  by mouth.    cyanocobalamin ER 1,000 mcg tablet Take 1 Tablet by mouth daily. loratadine (CLARITIN PO) Take  by mouth. amitriptyline (ELAVIL) 25 mg tablet Take 1 Tablet by mouth nightly. topiramate (TOPAMAX) 50 mg tablet Take 1 Tablet by mouth two (2) times a day. Indications: migraine prevention    pantoprazole (PROTONIX) 40 mg tablet  (Patient not taking: Reported on 2/3/2022)    cyanocobalamin (VITAMIN B12) 1,000 mcg/mL injection 1 mL by IntraMUSCular route every thirty (30) days. butalbital-acetaminophen-caffeine (FIORICET, ESGIC) -40 mg per tablet TAKE 1 TABLET BY MOUTH EVERY 6 HOURS AS NEEDED FOR HEADACHE    cetirizine (ZYRTEC) 10 mg tablet Take 10 mg by mouth daily. Indications: SEASONAL ALLERGIC RHINITIS (Patient not taking: Reported on 1/5/2022)    acetaminophen (TYLENOL EXTRA STRENGTH) 500 mg tablet Take  by mouth every six (6) hours as needed for Pain. No current facility-administered medications for this visit. Allergies   Allergen Reactions    Aspirin Unknown (comments)     Avoids due to brain aneurysm            Physical Exam:     There were no vitals taken for this visit.   ECOG PS: 0  General: alert, cooperative, no distress   Mental  status: normal mood, behavior, speech, dress, motor activity, and thought processes, able to follow commands   HENT: NCAT   Neck: no visualized mass   Resp: no respiratory distress   Neuro: no gross deficits   Skin: no discoloration or lesions of concern on visible areas   Psychiatric: normal affect, consistent with stated mood, no evidence of hallucinations           Results:     Lab Results   Component Value Date/Time    WBC 8.8 10/26/2022 08:45 AM    HGB 15.0 10/26/2022 08:45 AM    HCT 46.2 10/26/2022 08:45 AM    PLATELET 925 88/85/9104 08:45 AM    MCV 94.9 10/26/2022 08:45 AM    ABS. NEUTROPHILS 6.0 10/26/2022 08:45 AM     Lab Results   Component Value Date/Time    Sodium 140 04/28/2022 01:35 PM    Potassium 4.4 04/28/2022 01:35 PM    Chloride 108 04/28/2022 01:35 PM    CO2 25 04/28/2022 01:35 PM    Glucose 111 (H) 04/28/2022 01:35 PM    BUN 4 (L) 04/28/2022 01:35 PM    Creatinine 0.61 04/28/2022 01:35 PM    GFR est AA >60 04/28/2022 01:35 PM    GFR est non-AA >60 04/28/2022 01:35 PM    Calcium 9.5 04/28/2022 01:35 PM     Lab Results   Component Value Date/Time    Bilirubin, total 0.2 04/28/2022 01:35 PM    ALT (SGPT) 26 04/28/2022 01:35 PM    Alk. phosphatase 142 (H) 04/28/2022 01:35 PM    Protein, total 8.0 04/28/2022 01:35 PM    Albumin 4.1 04/28/2022 01:35 PM    Globulin 3.9 04/28/2022 01:35 PM       Assessment and Recommendations:     46year old with microcytic anemia, likely from iron deficiency. Unclear cause of iron deficiency. - Patient had profound anemia on initial presentation with Hg of 3.1 g/dl. - initial iron studies show ferritin of only 3  -Completed trial of oral iron supplementation, with marked improvement in iron stores and hemoglobin  - patient also has B12 and folate deficiencies.   These have now been corrected with B12 and folate supplementation  - colonoscopy shows pan colonic diverticulosis and internal plus external hemorrhoids  - endoscopy shows large hiatal hernia  - no clear sources of bleeding.     - labs from 10/26/22 show continued stability with Hg of 15 g/dl. No other cytopenias. B12 is high and appropriate. Iron saturation of 36%, but with ferritin of 66. Labs are stable. - no role for IV iron at this time. Iron studies are stable with oral iron supplementation  - B12 is stable with oral B12 and monthly B12 injections, would continue. - overall CBC appears stable without new cytopenias or evidence of bone marrow failure or dysfunction. She is iron replete using oral iron and dietary strategy. B12 is also stable. - she should continue following with GI     The patient can return to routine follow up yearly with CBC iron profile and B12 levels with her PCP. She was asked to call the clinic with any new or worsening clinical symptoms. Follow up as PRN.        Signed By: Marcela Ansari MD      Attending Medical Oncologist   [facility]

## 2022-11-02 NOTE — PROGRESS NOTES
Christi Shanks is a 46 y.o. female who presents for follow up of   Chief Complaint   Patient presents with    Follow-up    Anemia       The patient reports no new clinical symptoms or new complaints since last clinic evaluation. No interval hospitalizations reported    No interval surgery or procedures reported    No reported new medication changes reported       Medications reviewed with the patient, and chart updated to reflect changes.

## 2022-11-03 DIAGNOSIS — E53.8 B12 DEFICIENCY: ICD-10-CM

## 2022-11-03 DIAGNOSIS — G43.109 MIGRAINE WITH VERTIGO: ICD-10-CM

## 2022-11-03 RX ORDER — AMITRIPTYLINE HYDROCHLORIDE 25 MG/1
25 TABLET, FILM COATED ORAL
Qty: 30 TABLET | Refills: 11 | Status: SHIPPED | OUTPATIENT
Start: 2022-11-03

## 2022-11-03 RX ORDER — CYANOCOBALAMIN 1000 UG/ML
INJECTION, SOLUTION INTRAMUSCULAR; SUBCUTANEOUS
Qty: 3 ML | Refills: 3 | Status: SHIPPED | OUTPATIENT
Start: 2022-11-03

## 2022-11-03 RX ORDER — TOPIRAMATE 50 MG/1
TABLET, FILM COATED ORAL
Qty: 60 TABLET | Refills: 11 | Status: SHIPPED | OUTPATIENT
Start: 2022-11-03

## 2023-01-26 ENCOUNTER — OFFICE VISIT (OUTPATIENT)
Dept: FAMILY MEDICINE CLINIC | Age: 53
End: 2023-01-26
Payer: COMMERCIAL

## 2023-01-26 VITALS
HEART RATE: 78 BPM | OXYGEN SATURATION: 97 % | RESPIRATION RATE: 20 BRPM | BODY MASS INDEX: 25.22 KG/M2 | TEMPERATURE: 98.1 F | HEIGHT: 68 IN | WEIGHT: 166.4 LBS | SYSTOLIC BLOOD PRESSURE: 110 MMHG | DIASTOLIC BLOOD PRESSURE: 62 MMHG

## 2023-01-26 DIAGNOSIS — Z00.00 WELLNESS EXAMINATION: Primary | ICD-10-CM

## 2023-01-26 DIAGNOSIS — E55.9 VITAMIN D DEFICIENCY: ICD-10-CM

## 2023-01-26 DIAGNOSIS — Z13.29 SCREENING FOR THYROID DISORDER: ICD-10-CM

## 2023-01-26 DIAGNOSIS — R68.2 DRY MOUTH: ICD-10-CM

## 2023-01-26 DIAGNOSIS — Z13.220 SCREENING, LIPID: ICD-10-CM

## 2023-01-26 DIAGNOSIS — Z23 NEED FOR TDAP VACCINATION: ICD-10-CM

## 2023-01-26 PROCEDURE — 90471 IMMUNIZATION ADMIN: CPT | Performed by: NURSE PRACTITIONER

## 2023-01-26 PROCEDURE — 99396 PREV VISIT EST AGE 40-64: CPT | Performed by: NURSE PRACTITIONER

## 2023-01-26 PROCEDURE — 90715 TDAP VACCINE 7 YRS/> IM: CPT | Performed by: NURSE PRACTITIONER

## 2023-01-26 NOTE — PROGRESS NOTES
Chief Complaint   Patient presents with    Physical         S: Carol Chavez is a 46 y.o. female who presents for well exam     Last pap-hysterectomy  Last Mammogram-UTD  Colonoscopy-UTD  Routine Labs reviewed    History of anemia, followed by Hem/onc. Last labs done in October 2022 and hgb normal.   B12 injection monthly, self injection. Migraines  Patient states stable on current treatment  Had been followed neurology but has not seen in a while, says that her migraines controlled on current preventative. She was suppose to follow up in January but that neurologist left practice. Has not needed abortive treatment in a long time. Vertigo stable as well. Smokes 1 pack per day  Under a lot of stress due to family concerns. Complains of dry mouth. Pt is well, has no complaints at this time and denies any recent illness. No LMP recorded. Patient has had a hysterectomy. .         Denies any systemic symptoms including fever, myalgias, chills, weakness, weight loss and fatigue. Denies respiratory symptoms including cough, SOB, or wheezing. Denies any cardiac symptoms including chest pain, tightness or discomfort or syncope. ROS is otherwise negative. Nutrition: watching diet, has lost weight. Feeling good. Physical excercise: exercising few times week  Social:  Denies any recent stressors.    Tobacco: + smoking   Alcohol: Denies alcohol use    Past Medical History:   Diagnosis Date    Anemia NEC     Aneurysm     Delivery normal     x3    Dermatitis     Headache     Headache(784.0)     HX OTHER MEDICAL     Stroke ([address]     brain anuerysm, 1998    Vertigo      Past Surgical History:   Procedure Laterality Date    COLONOSCOPY N/A 11/3/2021    COLONOSCOPY performed by Mirta Chanel MD at [facility]OSCOPY    [facility]         HX GYN      cervical biopsy    HX HYSTERECTOMY      HX ORTHOPAEDIC      foot surgery    VT SIMPLE INTRACRANIAL ARYSM CAROTID CIRCULATION      UPPER GI ENDOSCOPY,BIOPSY  11/3/2021          Allergies   Allergen Reactions    Aspirin Unknown (comments)     Avoids due to brain aneurysm     Current Outpatient Medications   Medication Sig Dispense Refill    amitriptyline (ELAVIL) 25 mg tablet TAKE 1 TABLET BY MOUTH NIGHTLY 30 Tablet 11    topiramate (TOPAMAX) 50 mg tablet TAKE 1 TABLET BY MOUTH TWICE A DAY 60 Tablet 11    cyanocobalamin (VITAMIN B12) 1,000 mcg/mL injection INJECT 1 ML BY INTRAMUSCULAR ROUTE EVERY THIRTY (30) DAYS 3 mL 3    loratadine (Claritin) 10 mg chew Claritin      coenzyme q10 10 mg cap Co Q-10      CYANOCOBALAMIN, VITAMIN B-12, cyanocobalamin (vitamin B-12)      cholecalciferol, vitamin d3, 10 mcg (400 unit) cap Vitamin D3      Zinc-Ascorbic Acid-Pyridoxine 12-60-0.5 mg lozg zinc      estradioL (ESTRACE) 0.5 mg tablet estradiol 0.5 mg tablet   TAKE 1 TABLET BY MOUTH EVERY DFC58      folic acid (FOLVITE) 1 mg tablet Take 1 Tablet by mouth in the morning. 30 Tablet 6    ferrous sulfate 325 mg (65 mg iron) tablet Take 1 Tablet by mouth Daily (before breakfast). 30 Tablet 6    omeprazole (PRILOSEC) 40 mg capsule Take 40 mg by mouth daily. ZINC PO Take  by mouth. COQ10, LIPOSOMAL UBIQUINOL, PO Take  by mouth. cholecalciferol, vitamin D3, (VITAMIN D3 PO) Take  by mouth.      cyanocobalamin ER 1,000 mcg tablet Take 1 Tablet by mouth daily. 30 Tablet 6    loratadine (CLARITIN PO) Take  by mouth. butalbital-acetaminophen-caffeine (FIORICET, ESGIC) -40 mg per tablet TAKE 1 TABLET BY MOUTH EVERY 6 HOURS AS NEEDED FOR HEADACHE 20 Tab 1    acetaminophen (TYLENOL) 500 mg tablet Take  by mouth every six (6) hours as needed for Pain. folic acid 085 mcg tablet folic acid (Patient not taking: Reported on 1/26/2023)      pantoprazole (PROTONIX) 40 mg tablet  (Patient not taking: Reported on 2/3/2022)      cetirizine (ZYRTEC) 10 mg tablet Take 10 mg by mouth daily.  Indications: SEASONAL ALLERGIC RHINITIS (Patient not taking: Reported on 1/5/2022)           Agree with nurses note. O: VS: Visit Vitals  /62 (BP 1 Location: Left upper arm, BP Patient Position: Sitting, BP Cuff Size: Large adult)   Pulse 78   Temp 98.1 °F (36.7 °C) (Temporal)   Resp 20   Ht 5' 8\" (1.727 m)   Wt 166 lb 6.4 oz (75.5 kg)   SpO2 97%   BMI 25.30 kg/m²     PAIN: No complaints of pain today. GENERAL: Alaine Najjar is sitting on the table in no acute distress. Non-toxic. Well nourished. Well developed. Appropriately groomed. She is friendly, social and cooperative. HEAD:  Normocephalic. Atraumatic. Non tender sinuses x 4. EYE: PERRLA. EOMs intact. Sclera anicteric without injection. No drainage or discharge. EARS: Hearing intact bilaterally. External ear canals normal without evidence of blood or swelling. Bilateral TM's intact, pearly grey with landmarks visible. No erythema or effusion. NOSE: Patent. Nasal turbinates pink. No polyps noted. No erythema. No discharge. MOUTH: mucous membranes pink and moist. Posterior pharynx normal with cobblestone appearance. No erythema, white exudate or obstruction. NECK: supple. Midline trachea. No thyromegaly noted. RESP: Breath sounds are symmetrical bilaterally. Unlabored without SOB. Speaking in full sentences. Clear to auscultation bilaterally anteriorly and posteriorly. No wheezes. No rales or rhonchi. CV: normal rate. Regular rhythm. S1, S2 audible. No murmur noted. No rubs, clicks or gallops noted. ABDOMEN: Flat without bulges or pulsations. Soft and nondistended. No tenderness on palpation. No masses or organomegaly. No rebound, rigidity or guarding. Bowel sounds normal x 4 quadrants. BACK: No visible deformities or curvature. Full ROM. No pain on palpation of the spinous processes in the cervical, thoracic, lumbar, sacral regions. No CVA tenderness. NEURO:  awake, alert and oriented to person, place, and time and event. Clear speech.   Muscle strength is +5/5 x 4 extremities. Steady gait. MUSC:  Intact x 4 extremities. Full ROM x 4 extremities. No pain with movement. HEME/LYMPH: peripheral pulses palpable 2+ x 4 extremities. No peripheral edema is noted. No cervical adenopathy noted. SKIN: clean dry and intact throughout. No rashes, erythema, ecchymosis, lacerations, abrasions, suspicious moles noted. PSYCH: appropriate behavior, dress and thought processes. Good eye contact. Clear and coherent speech. Full affect. Good insight. Assessment: Well examination    Plan:  Differential diagnosis and treatment options reviewed with patient who is in agreement with treatment plan as outlined below. ICD-10-CM ICD-9-CM    1. Wellness examination  Y66.66 O48.9 METABOLIC PANEL, COMPREHENSIVE      LIPID PANEL      TSH 3RD GENERATION      METABOLIC PANEL, COMPREHENSIVE      LIPID PANEL      TSH 3RD GENERATION      2. Vitamin D deficiency  E55.9 268.9 VITAMIN D, 25 HYDROXY      VITAMIN D, 25 HYDROXY      3. Screening, lipid  Z13.220 V77.91 LIPID PANEL      LIPID PANEL      4. Screening for thyroid disorder  Z13.29 V77.0 TSH 3RD GENERATION      TSH 3RD GENERATION      5. Need for Tdap vaccination  Z23 V06.1 TDAP, BOOSTRIX, (AGE 10 YRS+), IM      RI IMMUNIZ ADMIN,1 SINGLE/COMB VAC/TOXOID      6. Dry mouth  R68.2 527.7         BP at goal.  Dry mouth - try biotene mouth wash. Try saliva stimulation with sour. Says she drinking plenty of water. Follow up with dentist.      Tigist Calixto today. VIS discussed and copy given in AVS  Routine labs today. Discussed health maintenance screening guidelines  Advised on nutrition, physical activity, weight management, tobacco, alcohol and safety  Reviewed and given written instruction, see below  Follow up in 1 year for annual wellness visit or sooner as needed. HEALTH MAINTENANCE GOALS & RECOMMENDATIONS (Included in AVS):   Attain and/or maintain a healthy weight (BMI between 18 and 30)  Attain and/or maintain regular physical activity for 30 minutes 5 days/week   Eat at least 5 servings of fruits and vegetables daily, preferably fresh  Limit fast food and prepared foods  Attain and/or maintain healthy blood pressure   Attain and/or maintain no nicotine/tobacco use status  Annual flu shot (or nasal mist as appropriate)  Stay up-to-date with immunizations including tetanus, pertussis, HPV, & pneumonia  Annual eye exam   Biannual dental visits  Annual skin cancer screening  Annual gynecologic visit for women over age 24  Annual prostate exam for black men starting at age 36, and for other races starting at age 48 (unless indicated earlier by history)  Colonscopy every 8 years after age 48 (unless indicated more frequently by history)  Consider daily 81mg aspirin for men over age 39 and women over age 54  Annual screening labs: thyroid tests (TSH and T4), complete blood count, lipid panel (cholesterol), hemoglobin A1c (diabetes screening), comprehensive metabolic panel (includes kidney function, liver function, and electrolytes), vitamin D level, urinalysis (with urine culture and microalbumin as medically indicated)  Consider annual testing for sexually transmitted infections including HIV  Screening bone density testing in all women over age 72      Verbal and written instructions (see AVS) provided. Patient expresses understanding and agreement of diagnosis and treatment plan.

## 2023-01-26 NOTE — PROGRESS NOTES
Chief Complaint   Patient presents with    Physical     1. Have you been to the ER, urgent care clinic since your last visit? Hospitalized since your last visit? no    2. Have you seen or consulted any other health care providers outside of the [address] [place] [address] since your last visit? Include any pap smears or colon screening.  neurology    Health Maintenance Due   Topic Date Due    COVID-19 Vaccine (1) Never done    DTaP/Tdap/Td series (1 - Tdap) Never done    Shingles Vaccine (1 of 2) Never done    Flu Vaccine (1) 08/01/2022    Depression Screen  02/03/2023

## 2023-01-27 LAB
25(OH)D3 SERPL-MCNC: 46.5 NG/ML (ref 30–100)
ALBUMIN SERPL-MCNC: 4.3 G/DL (ref 3.5–5)
ALBUMIN/GLOB SERPL: 1.3 (ref 1.1–2.2)
ALP SERPL-CCNC: 106 U/L (ref 45–117)
ALT SERPL-CCNC: 21 U/L (ref 12–78)
ANION GAP SERPL CALC-SCNC: 5 MMOL/L (ref 5–15)
AST SERPL-CCNC: 6 U/L (ref 15–37)
BILIRUB SERPL-MCNC: 0.2 MG/DL (ref 0.2–1)
BUN SERPL-MCNC: 13 MG/DL (ref 6–20)
BUN/CREAT SERPL: 19 (ref 12–20)
CALCIUM SERPL-MCNC: 9.6 MG/DL (ref 8.5–10.1)
CHLORIDE SERPL-SCNC: 109 MMOL/L (ref 97–108)
CHOLEST SERPL-MCNC: 218 MG/DL
CO2 SERPL-SCNC: 26 MMOL/L (ref 21–32)
CREAT SERPL-MCNC: 0.69 MG/DL (ref 0.55–1.02)
GLOBULIN SER CALC-MCNC: 3.3 G/DL (ref 2–4)
GLUCOSE SERPL-MCNC: 107 MG/DL (ref 65–100)
HDLC SERPL-MCNC: 51 MG/DL
HDLC SERPL: 4.3 (ref 0–5)
LDLC SERPL CALC-MCNC: 132.8 MG/DL (ref 0–100)
POTASSIUM SERPL-SCNC: 4.3 MMOL/L (ref 3.5–5.1)
PROT SERPL-MCNC: 7.6 G/DL (ref 6.4–8.2)
SODIUM SERPL-SCNC: 140 MMOL/L (ref 136–145)
TRIGL SERPL-MCNC: 171 MG/DL (ref ?–150)
TSH SERPL DL<=0.05 MIU/L-ACNC: 0.76 UIU/ML (ref 0.36–3.74)
VLDLC SERPL CALC-MCNC: 34.2 MG/DL

## 2023-01-31 NOTE — PROGRESS NOTES
Labs are back and look okay except your cholesterol is a little elevated. Try these lifestyle strategies to lower your cholesterol. Decrease saturated fats in diet. Saturated fats are found in:    meats including fatty beef, pork, lamb, chicken or turkey with skin, and ground beef,   high fat cheese,   whole fat diary, milk, cream and ice cream,  Butter  Most saturated fats are found in animal products  Eliminate Trans Fats from your diet. Foods that contain TF include: Fast foods: fried chicken, biscuits, fried fish for fried fish sandwich's, [place] fries  Donuts and muffins  Crackers and cookies  Cake, cake icing and pies  Canned biscuits or refrigerated dough  Microwave popcorn  Coffee creamer  Frozen pizza  Sick margarine or vegetable shortening  Increase the amount of soluble fiber in your diet. Sources of soluble fiber include:  oats, peas, beans, apples, citrus fruits, carrots, barley and psyllium  Include omega-3 fatty acids in your diet, these are found in:   FISH! Try and eat 2 servings of fish a week. Good examples include salmon, albacore tuna, halibut, trout and mackerel. Take a fish oil supplement daily  Look for foods enriched with plant-sterols. There are many products on the market which are fortified with this nutrient including buttery spreads and yogurts. Look for the BeneKindling and Promise brands. Increase your physical activity to at least 150 minutes a week. This is just 5 sessions of thirty minutes a week! If you are overweight, losing weight can significantly lower your cholesterol. If you are a smoker, QUIT SMOKING, this can significantly lower your cholesterol and overall cardiovascular risk. It also can help to decrease your risk for many other conditions. Should repeat labs in 3-6 months to recheck  cholesterol.   Let me know if you have any questions  Best   Belinda Adhikari NP

## 2023-02-09 ENCOUNTER — PATIENT MESSAGE (OUTPATIENT)
Dept: FAMILY MEDICINE CLINIC | Age: 53
End: 2023-02-09

## 2023-02-09 DIAGNOSIS — E53.8 FOLATE DEFICIENCY: ICD-10-CM

## 2023-02-09 DIAGNOSIS — E53.8 B12 DEFICIENCY: ICD-10-CM

## 2023-02-09 DIAGNOSIS — D64.9 SEVERE ANEMIA: ICD-10-CM

## 2023-02-10 RX ORDER — FOLIC ACID 1 MG/1
1 TABLET ORAL DAILY
Qty: 30 TABLET | Refills: 11 | Status: SHIPPED | OUTPATIENT
Start: 2023-02-10

## 2023-02-10 RX ORDER — LANOLIN ALCOHOL/MO/W.PET/CERES
325 CREAM (GRAM) TOPICAL
Qty: 30 TABLET | Refills: 11 | Status: SHIPPED | OUTPATIENT
Start: 2023-02-10

## 2023-02-10 NOTE — TELEPHONE ENCOUNTER
From: Preethi Pugh  To: Boris Siegel NP  Sent: 2/9/2023 7:04 PM EST  Subject: Prescription refills     Dr. Jeovany Puckett,    I had requested refills for Folic acid and ferrous sulfate through the my chart. I think the system automatically sent the request to Dr. Marilee Donovan, he is not going to refill them as he said he does not need to see me again unless I have problems again, but wanted me to stay on the medications. Can you please refill them?     Thanks,  Francisco Trivedi

## 2023-04-25 NOTE — PROGRESS NOTES
[facility] at [address]  W: [phone] F: [phone]      Reason for Visit:   Magdalena Gómez is a 46 y.o. female who is seen in consultation at the request of Guerline Nelson NP for evaluation of microcytic anemia. Hematology / Oncology Treatment History:     Hematological/Oncological Diagnosis: Microcytic anemia    Date of Diagnosis: 11/1/2021    Treatment course: IV B12, s/p 3U PRBC as inpatient. History of Present Illness:   46year old found to have severe anemia with FOBT positive stools in November 2021. Presenting Hg was 3.7 g/dl. Workup with colonoscopy showed hemorrhoids, endoscopy showed a large hiatal hernia without acute bleeding. Labs showed B12 deficiency, iron deficiency. The patient started oral iron supplementation but has had no improvement with most recent iron saturation of 5%, ferritin of 3. Repeat B12 in late December shows low normal B12 at 354, but low folate at 4.8. The patient has symptoms of fatigue but denies any dizziness or neuropathy. Most recent cbc shows Hg of 10.7 with mcv of only 77. No other cytopenias. She denies any unintentional weight loss, night sweats, appetite changes or other symptoms that would be concerning for malignancy. Medication review shows that the patient is taking Protonix. Family history reviewed, non contributory. Social history reviewed, also non contributory. Interval History:   2/3/22: Patient doing well, since last encounter, the patient has been on oral iron supplementation with ferrous sulfate. While she has some constipation, she has been compliant for now. Iron saturation has improved to 31%, hemoglobin has improved to 12.2 g/dL. Overall, the patient is improved. D62 and folic acid levels are normal.  The patient reports that her clinical symptoms are improving.     Magdalena Gómez, who was evaluated through a synchronous (real-time) audio-video encounter, and/or her healthcare decision maker, is aware that it is a billable service, which includes applicable co-pays, with coverage as determined by her insurance carrier. She provided verbal consent to proceed and patient identification was verified. This visit was conducted pursuant to the emergency declaration under the [facility], [facility] authority and the [employer] and [employer] General Act. A caregiver was present when appropriate. Ability to conduct physical exam was limited. The patient was located at home in a state where the provider was licensed to provide care. --Ariadna Franklin MD on 2/10/2022 at 3:24 PM    Review of Systems: A complete review of systems was obtained, negative except as described above.     Past Medical History:   Diagnosis Date    Anemia NEC     Aneurysm     Delivery normal     x3    Dermatitis     Headache     Headache(784.0)     HX OTHER MEDICAL     St[address] ([facility] [address]     brain anuerysm,     Vertigo       Past Surgical History:   Procedure Laterality Date    COLONOSCOPY N/A 11/3/2021    COLONOSCOPY performed by Eileen Serna MD at [address] [place] [address]  11/3/2021         HX GYN      cervical biopsy    HX HYSTERECTOMY      HX ORTHOPAEDIC      foot surgery    MA ANEURYSM, INTRACRAN, SIMPLE SURG      UPPER GI ENDOSCOPY,BIOPSY  11/3/2021           Social History     Tobacco Use    Smoking status: Former Smoker     Packs/day: 1.00     Years: 5.00     Pack years: 5.00     Quit date: 3/9/2004     Years since quittin.9    Smokeless tobacco: Never Used   Substance Use Topics    Alcohol use: Yes     Comment: rarely      Family History   Problem Relation Age of Onset    Diabetes Mother     Hypertension Mother     Heart Disease Maternal Aunt      Current Outpatient Medications   Medication Sig    omeprazole (PRILOSEC) 40 mg capsule Take 40 mg by mouth daily.  ZINC PO Take  by mouth.  COQ10, LIPOSOMAL UBIQUINOL, PO Take  by mouth.  cholecalciferol, vitamin D3, (VITAMIN D3 PO) Take  by mouth.  folic acid (FOLVITE) 1 mg tablet Take 1 Tablet by mouth daily.  ferrous sulfate 325 mg (65 mg iron) tablet Take 1 Tablet by mouth Daily (before breakfast).  cyanocobalamin ER 1,000 mcg tablet Take 1 Tablet by mouth daily.  loratadine (CLARITIN PO) Take  by mouth.  topiramate (TOPAMAX) 50 mg tablet Take 1 Tablet by mouth two (2) times a day. Indications: migraine prevention    cyanocobalamin (VITAMIN B12) 1,000 mcg/mL injection 1 mL by IntraMUSCular route every thirty (30) days.  butalbital-acetaminophen-caffeine (FIORICET, ESGIC) -40 mg per tablet TAKE 1 TABLET BY MOUTH EVERY 6 HOURS AS NEEDED FOR HEADACHE    acetaminophen (TYLENOL EXTRA STRENGTH) 500 mg tablet Take  by mouth every six (6) hours as needed for Pain.  amitriptyline (ELAVIL) 25 mg tablet Take 1 Tablet by mouth nightly.  pantoprazole (PROTONIX) 40 mg tablet  (Patient not taking: Reported on 2/3/2022)    cetirizine (ZYRTEC) 10 mg tablet Take 10 mg by mouth daily. Indications: SEASONAL ALLERGIC RHINITIS (Patient not taking: Reported on 1/5/2022)     No current facility-administered medications for this visit. Allergies   Allergen Reactions    Aspirin Unknown (comments)     Avoids due to brain aneurysm            Physical Exam:     There were no vitals taken for this visit.   ECOG PS: 1  General: alert, cooperative, no distress   Mental  status: normal mood, behavior, speech, dress, motor activity, and thought processes, able to follow commands   HENT: NCAT   Neck: no visualized mass   Resp: no respiratory distress   Neuro: no gross deficits   Skin: no discoloration or lesions of concern on visible areas   Psychiatric: normal affect, consistent with stated mood, no evidence of hallucinations           Results:     Lab Results Component Value Date/Time    WBC 6.7 01/31/2022 08:05 AM    HGB 12.2 01/31/2022 08:05 AM    HCT 40.9 01/31/2022 08:05 AM    PLATELET 178 03/87/8477 08:05 AM    MCV 82.1 01/31/2022 08:05 AM    ABS. NEUTROPHILS 4.7 01/31/2022 08:05 AM     Lab Results   Component Value Date/Time    Sodium 139 01/31/2022 08:05 AM    Potassium 4.6 01/31/2022 08:05 AM    Chloride 108 01/31/2022 08:05 AM    CO2 24 01/31/2022 08:05 AM    Glucose 154 (H) 01/31/2022 08:05 AM    BUN 8 01/31/2022 08:05 AM    Creatinine 0.82 01/31/2022 08:05 AM    GFR est AA >60 01/31/2022 08:05 AM    GFR est non-AA >60 01/31/2022 08:05 AM    Calcium 10.1 01/31/2022 08:05 AM     Lab Results   Component Value Date/Time    Bilirubin, total 0.2 01/31/2022 08:05 AM    ALT (SGPT) 24 01/31/2022 08:05 AM    Alk. phosphatase 138 (H) 01/31/2022 08:05 AM    Protein, total 7.8 01/31/2022 08:05 AM    Albumin 3.8 01/31/2022 08:05 AM    Globulin 4.0 01/31/2022 08:05 AM       Assessment and Recommendations:     46year old with microcytic anemia, likely from iron deficiency. Unclear cause of iron deficiency. - Patient had profound anemia on initial presentation with Hg of 3.1 g/dl. - last iron studies show ferritin of only 3  -Completed trial of oral iron supplementation, with marked improvement in iron stores and hemoglobin  - patient also has B12 and folate deficiencies.   These have now been corrected with B12 and folate supplementation  - colonoscopy shows pan colonic diverticulosis and internal plus external hemorrhoids  - endoscopy shows large hiatal hernia  - no clear sources of bleeding.       -Patient has overall clinical improvement, plan for follow-up in 3 months with repeat labs      Signed By: Duy Glover MD      Attending Medical Oncologist   [facility] oriented to person, place, time and situation

## 2023-05-11 ENCOUNTER — TRANSCRIBE ORDERS (OUTPATIENT)
Facility: HOSPITAL | Age: 53
End: 2023-05-11

## 2023-05-11 DIAGNOSIS — Z12.31 OTHER SCREENING MAMMOGRAM: Primary | ICD-10-CM

## 2023-06-20 ENCOUNTER — HOSPITAL ENCOUNTER (OUTPATIENT)
Facility: HOSPITAL | Age: 53
Discharge: HOME | End: 2023-06-23
Payer: COMMERCIAL

## 2023-06-20 DIAGNOSIS — Z12.31 OTHER SCREENING MAMMOGRAM: ICD-10-CM

## 2023-06-20 PROCEDURE — 77067 SCR MAMMO BI INCL CAD: CPT

## 2023-11-21 RX ORDER — AMITRIPTYLINE HYDROCHLORIDE 25 MG/1
25 TABLET, FILM COATED ORAL NIGHTLY
Qty: 90 TABLET | Refills: 2 | Status: SHIPPED | OUTPATIENT
Start: 2023-11-21

## 2023-11-21 RX ORDER — TOPIRAMATE 50 MG/1
50 TABLET, FILM COATED ORAL 2 TIMES DAILY
Qty: 180 TABLET | Refills: 1 | Status: SHIPPED | OUTPATIENT
Start: 2023-11-21

## 2024-01-02 ENCOUNTER — OFFICE VISIT (OUTPATIENT)
Age: 54
End: 2024-01-02

## 2024-01-02 VITALS
OXYGEN SATURATION: 97 % | RESPIRATION RATE: 18 BRPM | DIASTOLIC BLOOD PRESSURE: 85 MMHG | BODY MASS INDEX: 26.55 KG/M2 | WEIGHT: 175.2 LBS | SYSTOLIC BLOOD PRESSURE: 118 MMHG | HEIGHT: 68 IN | HEART RATE: 89 BPM | TEMPERATURE: 98.1 F

## 2024-01-02 DIAGNOSIS — Z23 NEEDS FLU SHOT: ICD-10-CM

## 2024-01-02 DIAGNOSIS — E53.8 DEFICIENCY OF OTHER SPECIFIED B GROUP VITAMINS: ICD-10-CM

## 2024-01-02 DIAGNOSIS — Z13.29 SCREENING FOR THYROID DISORDER: ICD-10-CM

## 2024-01-02 DIAGNOSIS — E55.9 VITAMIN D DEFICIENCY, UNSPECIFIED: ICD-10-CM

## 2024-01-02 DIAGNOSIS — D50.9 IRON DEFICIENCY ANEMIA, UNSPECIFIED IRON DEFICIENCY ANEMIA TYPE: ICD-10-CM

## 2024-01-02 DIAGNOSIS — R73.03 PREDIABETES: ICD-10-CM

## 2024-01-02 DIAGNOSIS — Z13.220 ENCOUNTER FOR SCREENING FOR LIPOID DISORDERS: ICD-10-CM

## 2024-01-02 DIAGNOSIS — G43.911 INTRACTABLE MIGRAINE WITH STATUS MIGRAINOSUS, UNSPECIFIED MIGRAINE TYPE: ICD-10-CM

## 2024-01-02 DIAGNOSIS — Z00.00 ENCOUNTER FOR WELL ADULT EXAM WITHOUT ABNORMAL FINDINGS: Primary | ICD-10-CM

## 2024-01-02 DIAGNOSIS — E55.9 VITAMIN D DEFICIENCY: ICD-10-CM

## 2024-01-02 LAB
ALBUMIN SERPL-MCNC: 4.1 G/DL (ref 3.5–5)
ALBUMIN/GLOB SERPL: 1.1 (ref 1.1–2.2)
ALP SERPL-CCNC: 91 U/L (ref 45–117)
ALT SERPL-CCNC: 18 U/L (ref 12–78)
ANION GAP SERPL CALC-SCNC: 3 MMOL/L (ref 5–15)
AST SERPL-CCNC: 12 U/L (ref 15–37)
BILIRUB SERPL-MCNC: 0.3 MG/DL (ref 0.2–1)
BUN SERPL-MCNC: 14 MG/DL (ref 6–20)
BUN/CREAT SERPL: 16 (ref 12–20)
CALCIUM SERPL-MCNC: 9.4 MG/DL (ref 8.5–10.1)
CHLORIDE SERPL-SCNC: 113 MMOL/L (ref 97–108)
CHOLEST SERPL-MCNC: 239 MG/DL
CO2 SERPL-SCNC: 25 MMOL/L (ref 21–32)
CREAT SERPL-MCNC: 0.88 MG/DL (ref 0.55–1.02)
FOLATE SERPL-MCNC: 48.6 NG/ML (ref 5–21)
GLOBULIN SER CALC-MCNC: 3.6 G/DL (ref 2–4)
GLUCOSE SERPL-MCNC: 163 MG/DL (ref 65–100)
HDLC SERPL-MCNC: 59 MG/DL
HDLC SERPL: 4.1 (ref 0–5)
IRON SATN MFR SERPL: 29 % (ref 20–50)
IRON SERPL-MCNC: 93 UG/DL (ref 35–150)
LDLC SERPL CALC-MCNC: 156.6 MG/DL (ref 0–100)
POTASSIUM SERPL-SCNC: 5.2 MMOL/L (ref 3.5–5.1)
PROT SERPL-MCNC: 7.7 G/DL (ref 6.4–8.2)
SODIUM SERPL-SCNC: 141 MMOL/L (ref 136–145)
TIBC SERPL-MCNC: 316 UG/DL (ref 250–450)
TRIGL SERPL-MCNC: 117 MG/DL
TSH SERPL DL<=0.05 MIU/L-ACNC: 1.35 UIU/ML (ref 0.36–3.74)
VIT B12 SERPL-MCNC: 1265 PG/ML (ref 193–986)
VLDLC SERPL CALC-MCNC: 23.4 MG/DL

## 2024-01-02 PROCEDURE — 90674 CCIIV4 VAC NO PRSV 0.5 ML IM: CPT | Performed by: NURSE PRACTITIONER

## 2024-01-02 PROCEDURE — 99396 PREV VISIT EST AGE 40-64: CPT | Performed by: NURSE PRACTITIONER

## 2024-01-02 PROCEDURE — PBSHW INFLUENZA, FLUCELVAX, (AGE 6 MO+), IM, PF, 0.5 ML: Performed by: NURSE PRACTITIONER

## 2024-01-02 RX ORDER — FOLIC ACID 1 MG/1
1 TABLET ORAL DAILY
Qty: 90 TABLET | Refills: 3 | Status: SHIPPED | OUTPATIENT
Start: 2024-01-02

## 2024-01-02 RX ORDER — BUTALBITAL, ACETAMINOPHEN AND CAFFEINE 50; 325; 40 MG/1; MG/1; MG/1
TABLET ORAL
Qty: 90 TABLET | Refills: 1 | Status: SHIPPED | OUTPATIENT
Start: 2024-01-02

## 2024-01-02 RX ORDER — FEXOFENADINE HCL 60 MG/1
60 TABLET, FILM COATED ORAL DAILY
COMMUNITY

## 2024-01-02 RX ORDER — FERROUS SULFATE 325(65) MG
325 TABLET ORAL
Qty: 90 TABLET | Refills: 3 | Status: SHIPPED | OUTPATIENT
Start: 2024-01-02

## 2024-01-02 RX ORDER — TOPIRAMATE 50 MG/1
50 TABLET, FILM COATED ORAL 2 TIMES DAILY
Qty: 180 TABLET | Refills: 3 | Status: SHIPPED | OUTPATIENT
Start: 2024-01-02

## 2024-01-02 RX ORDER — AMITRIPTYLINE HYDROCHLORIDE 25 MG/1
25 TABLET, FILM COATED ORAL NIGHTLY
Qty: 90 TABLET | Refills: 2 | Status: SHIPPED | OUTPATIENT
Start: 2024-01-02

## 2024-01-02 SDOH — ECONOMIC STABILITY: HOUSING INSECURITY
IN THE LAST 12 MONTHS, WAS THERE A TIME WHEN YOU DID NOT HAVE A STEADY PLACE TO SLEEP OR SLEPT IN A SHELTER (INCLUDING NOW)?: NO

## 2024-01-02 SDOH — ECONOMIC STABILITY: INCOME INSECURITY: HOW HARD IS IT FOR YOU TO PAY FOR THE VERY BASICS LIKE FOOD, HOUSING, MEDICAL CARE, AND HEATING?: NOT HARD AT ALL

## 2024-01-02 SDOH — ECONOMIC STABILITY: FOOD INSECURITY: WITHIN THE PAST 12 MONTHS, THE FOOD YOU BOUGHT JUST DIDN'T LAST AND YOU DIDN'T HAVE MONEY TO GET MORE.: NEVER TRUE

## 2024-01-02 SDOH — ECONOMIC STABILITY: FOOD INSECURITY: WITHIN THE PAST 12 MONTHS, YOU WORRIED THAT YOUR FOOD WOULD RUN OUT BEFORE YOU GOT MONEY TO BUY MORE.: NEVER TRUE

## 2024-01-02 ASSESSMENT — PATIENT HEALTH QUESTIONNAIRE - PHQ9
SUM OF ALL RESPONSES TO PHQ QUESTIONS 1-9: 0
SUM OF ALL RESPONSES TO PHQ9 QUESTIONS 1 & 2: 0
2. FEELING DOWN, DEPRESSED OR HOPELESS: 0
SUM OF ALL RESPONSES TO PHQ QUESTIONS 1-9: 0
SUM OF ALL RESPONSES TO PHQ QUESTIONS 1-9: 0
1. LITTLE INTEREST OR PLEASURE IN DOING THINGS: 0
SUM OF ALL RESPONSES TO PHQ QUESTIONS 1-9: 0

## 2024-01-02 NOTE — PROGRESS NOTES
Chief Complaint   Patient presents with    Annual Exam         Health Maintenance Due   Topic Date Due    Hepatitis B vaccine (1 of 3 - 3-dose series) Never done    COVID-19 Vaccine (1) Never done    HIV screen  Never done    Shingles vaccine (1 of 2) Never done    A1C test (Diabetic or Prediabetic)  11/04/2022    Flu vaccine (1) 08/01/2023    Depression Screen  01/26/2024         \"Have you been to the ER, urgent care clinic since your last visit?  Hospitalized since your last visit?\"    NO    “Have you seen or consulted any other health care providers outside of  since your last visit?”    NO           
Arm, Position: Sitting, Cuff Size: Large Adult)   Pulse 89   Temp 98.1 °F (36.7 °C) (Temporal)   Resp 18   Ht 1.727 m (5' 8\")   Wt 79.5 kg (175 lb 3.2 oz)   SpO2 97%   BMI 26.64 kg/m²   Wt Readings from Last 3 Encounters:   01/02/24 79.5 kg (175 lb 3.2 oz)   01/26/23 75.5 kg (166 lb 6.4 oz)   01/12/22 89.8 kg (198 lb)       Physical Exam  Gen: alert, oriented, no acute distress  Head: normocephalic, atraumatic  Ears: external auditory canals clear, TMs without erythema or effusion  Eyes: pupils equal round reactive to light, sclera clear, conjunctiva clear  Nose: normal turbinates, no rhinorrhea  Oral: moist mucus membranes, no oral lesions, no pharyngeal inflammation or exudate  Neck: supple, no lymphadenopathy  Resp: no increased work of breathing, lungs clear to ausculation bilaterally  CV: S1, S2 normal, no murmurs, rubs, or gallops.    Abd: soft, not tender, not distended.  No hepatosplenomegaly. Normal bowel sounds.  No hernias.   Neuro: cranial nerves intact, normal strength and movement in all extremities, and sensation intact and symmetric.  Skin: no lesion or rash          Assessment   Plan    Diagnosis Orders   1. Encounter for well adult exam without abnormal findings        2. Deficiency of other specified B group vitamins  Vitamin B12 & Folate    Vitamin B12 & Folate      3. Vitamin D deficiency, unspecified        4. Encounter for screening for lipoid disorders  Lipid Panel    Lipid Panel      5. Vitamin D deficiency        6. Screening for thyroid disorder  TSH    TSH      7. Prediabetes  Comprehensive Metabolic Panel    Hemoglobin A1C    Hemoglobin A1C    Comprehensive Metabolic Panel      8. Iron deficiency anemia, unspecified iron deficiency anemia type  CBC with Auto Differential    ferrous sulfate (IRON 325) 325 (65 Fe) MG tablet    folic acid (FOLVITE) 1 MG tablet    Iron and TIBC    Iron and TIBC    CBC with Auto Differential      9. Intractable migraine with status migrainosus,

## 2024-01-03 LAB
BASOPHILS # BLD: 0.1 K/UL (ref 0–0.1)
BASOPHILS NFR BLD: 1 % (ref 0–1)
DIFFERENTIAL METHOD BLD: ABNORMAL
EOSINOPHIL # BLD: 0.1 K/UL (ref 0–0.4)
EOSINOPHIL NFR BLD: 1 % (ref 0–7)
ERYTHROCYTE [DISTWIDTH] IN BLOOD BY AUTOMATED COUNT: 13.5 % (ref 11.5–14.5)
EST. AVERAGE GLUCOSE BLD GHB EST-MCNC: 126 MG/DL
HBA1C MFR BLD: 6 % (ref 4–5.6)
HCT VFR BLD AUTO: 45.8 % (ref 35–47)
HGB BLD-MCNC: 15.1 G/DL (ref 11.5–16)
IMM GRANULOCYTES # BLD AUTO: 0.1 K/UL (ref 0–0.04)
IMM GRANULOCYTES NFR BLD AUTO: 1 % (ref 0–0.5)
LYMPHOCYTES # BLD: 2.1 K/UL (ref 0.8–3.5)
LYMPHOCYTES NFR BLD: 25 % (ref 12–49)
MCH RBC QN AUTO: 31.3 PG (ref 26–34)
MCHC RBC AUTO-ENTMCNC: 33 G/DL (ref 30–36.5)
MCV RBC AUTO: 95 FL (ref 80–99)
MONOCYTES # BLD: 0.5 K/UL (ref 0–1)
MONOCYTES NFR BLD: 6 % (ref 5–13)
NEUTS SEG # BLD: 5.6 K/UL (ref 1.8–8)
NEUTS SEG NFR BLD: 66 % (ref 32–75)
NRBC # BLD: 0 K/UL (ref 0–0.01)
NRBC BLD-RTO: 0 PER 100 WBC
PLATELET # BLD AUTO: 189 K/UL (ref 150–400)
PMV BLD AUTO: 12.3 FL (ref 8.9–12.9)
RBC # BLD AUTO: 4.82 M/UL (ref 3.8–5.2)
WBC # BLD AUTO: 8.4 K/UL (ref 3.6–11)

## 2025-03-24 ENCOUNTER — TRANSCRIBE ORDERS (OUTPATIENT)
Facility: HOSPITAL | Age: 55
End: 2025-03-24

## 2025-03-24 DIAGNOSIS — F17.200 NICOTINE DEPENDENCE, UNCOMPLICATED, UNSPECIFIED NICOTINE PRODUCT TYPE: Primary | ICD-10-CM

## 2025-04-17 ENCOUNTER — TRANSCRIBE ORDERS (OUTPATIENT)
Facility: HOSPITAL | Age: 55
End: 2025-04-17

## 2025-04-17 DIAGNOSIS — R06.09 OTHER FORMS OF DYSPNEA: Primary | ICD-10-CM

## 2025-05-08 ENCOUNTER — HOSPITAL ENCOUNTER (OUTPATIENT)
Facility: HOSPITAL | Age: 55
Discharge: HOME | End: 2025-05-11
Attending: INTERNAL MEDICINE

## 2025-05-08 VITALS — OXYGEN SATURATION: 99 %

## 2025-05-08 DIAGNOSIS — F17.200 NICOTINE DEPENDENCE, UNCOMPLICATED, UNSPECIFIED NICOTINE PRODUCT TYPE: ICD-10-CM

## 2025-05-08 DIAGNOSIS — R06.09 OTHER FORMS OF DYSPNEA: ICD-10-CM

## 2025-05-08 PROCEDURE — 94640 AIRWAY INHALATION TREATMENT: CPT

## 2025-05-08 PROCEDURE — 71250 CT THORAX DX C-: CPT

## 2025-05-08 PROCEDURE — 94729 DIFFUSING CAPACITY: CPT

## 2025-05-08 PROCEDURE — 94060 EVALUATION OF WHEEZING: CPT

## 2025-05-08 PROCEDURE — 6360000002 HC RX W HCPCS: Performed by: INTERNAL MEDICINE

## 2025-05-08 PROCEDURE — 94726 PLETHYSMOGRAPHY LUNG VOLUMES: CPT

## 2025-05-08 RX ORDER — ALBUTEROL SULFATE 0.83 MG/ML
2.5 SOLUTION RESPIRATORY (INHALATION)
Status: COMPLETED | OUTPATIENT
Start: 2025-05-08 | End: 2025-05-08

## 2025-05-08 RX ADMIN — ALBUTEROL SULFATE 2.5 MG: 2.5 SOLUTION RESPIRATORY (INHALATION) at 11:00

## 2025-05-16 NOTE — PROCEDURES
Pulmonary Function Test        PATIENT ID: Vanessa Elder  MRN: [id number]   YOB: 1970    DATE OF ADMISSION: [id number]    PRIMARY CARE PROVIDER: Ruchi Villalobos APRN - NP       Spirometry:  Spirometry is normal.    Bronchodilator response:  No significant improvement with bronchodilator therapy    Volumes:  Total lung capacity is within normal limits    Diffusion:  Diffusing capacity is normal.    Flow-Volume:  The flow-volume loop is compatible with small airways obstruction.      Signed:   Erick Shields MD  5/16/2025  5:34 PM

## (undated) DEVICE — SYR 10ML LUER LOK 1/5ML GRAD --

## (undated) DEVICE — TOWEL 4 PLY TISS 19X30 SUE WHT

## (undated) DEVICE — BLOCK BITE ENDOSCP AD 21 MM W/ DIL BLU LF DISP

## (undated) DEVICE — NEEDLE HYPO 18GA L1.5IN PNK S STL HUB POLYPR SHLD REG BVL

## (undated) DEVICE — NEONATAL-ADULT SPO2 SENSOR: Brand: NELLCOR

## (undated) DEVICE — YANKAUER,TAPERED BULBOUS TIP,W/O VENT: Brand: MEDLINE

## (undated) DEVICE — 1200 GUARD II KIT W/5MM TUBE W/O VAC TUBE: Brand: GUARDIAN

## (undated) DEVICE — Device

## (undated) DEVICE — CATH IV AUTOGRD BC PNK 20GA 25 -- INSYTE

## (undated) DEVICE — AMBU SPUR II ADULT WITH OPEN RESERVOIR 40, WITH DURACLEAR FACE MASK SIZE MEDIUM ADULT AND WITH PEEP VALVE. 6 PCS/BOX: Brand: SPUR® II ADULT RESUSCITATORSINGLE PATIENT USE RESUSCITATOR

## (undated) DEVICE — SYR 3ML LL TIP 1/10ML GRAD --

## (undated) DEVICE — Z DISCONTINUED PER MEDLINE LINE GAS SAMPLING O2/CO2 LNG AD 13 FT NSL W/ TBNG FILTERLINE

## (undated) DEVICE — SOLIDIFIER FLD 2OZ 1500CC N DISINF IN BTL DISP SAFESORB

## (undated) DEVICE — ELECTRODE,RADIOTRANSLUCENT,FOAM,5PK: Brand: MEDLINE

## (undated) DEVICE — SET ADMIN 16ML TBNG L100IN 2 Y INJ SITE IV PIGGY BK DISP

## (undated) DEVICE — CONTAINER SPEC 20 ML LID NEUT BUFF FORMALIN 10 % POLYPR STS

## (undated) DEVICE — BAG SPEC BIOHZRD 10 X 10 IN --

## (undated) DEVICE — FORCEPS BX L160CM DIA8MM GRSP DISECT CUP TIP NONLOCKING ROT

## (undated) DEVICE — MEDI-VAC YANK SUCT HNDL W/TPRD BULBOUS TIP & NON-CONDUCTIVE: Brand: CARDINAL HEALTH

## (undated) DEVICE — BASIN EMSIS 16OZ GRAPHITE PLAS KID SHP MOLD GRAD FOR ORAL